# Patient Record
Sex: FEMALE | Race: WHITE | NOT HISPANIC OR LATINO | Employment: FULL TIME | URBAN - METROPOLITAN AREA
[De-identification: names, ages, dates, MRNs, and addresses within clinical notes are randomized per-mention and may not be internally consistent; named-entity substitution may affect disease eponyms.]

---

## 2019-07-15 ENCOUNTER — LAB (OUTPATIENT)
Dept: LAB | Facility: MEDICAL CENTER | Age: 29
End: 2019-07-15
Attending: INTERNAL MEDICINE
Payer: COMMERCIAL

## 2019-07-15 ENCOUNTER — OFFICE VISIT (OUTPATIENT)
Dept: GASTROENTEROLOGY | Facility: MEDICAL CENTER | Age: 29
End: 2019-07-15
Payer: COMMERCIAL

## 2019-07-15 VITALS
TEMPERATURE: 98.5 F | DIASTOLIC BLOOD PRESSURE: 72 MMHG | BODY MASS INDEX: 21.78 KG/M2 | WEIGHT: 127.6 LBS | HEART RATE: 51 BPM | SYSTOLIC BLOOD PRESSURE: 108 MMHG | HEIGHT: 64 IN

## 2019-07-15 DIAGNOSIS — K92.1 MELENA: ICD-10-CM

## 2019-07-15 DIAGNOSIS — R10.13 EPIGASTRIC PAIN: ICD-10-CM

## 2019-07-15 DIAGNOSIS — K90.0 CELIAC DISEASE: ICD-10-CM

## 2019-07-15 DIAGNOSIS — K90.0 CELIAC DISEASE: Primary | ICD-10-CM

## 2019-07-15 LAB
ALBUMIN SERPL BCP-MCNC: 3.7 G/DL (ref 3.5–5)
ALP SERPL-CCNC: 48 U/L (ref 46–116)
ALT SERPL W P-5'-P-CCNC: 31 U/L (ref 12–78)
AST SERPL W P-5'-P-CCNC: 15 U/L (ref 5–45)
BILIRUB DIRECT SERPL-MCNC: 0.1 MG/DL (ref 0–0.2)
BILIRUB SERPL-MCNC: 0.29 MG/DL (ref 0.2–1)
ERYTHROCYTE [DISTWIDTH] IN BLOOD BY AUTOMATED COUNT: 11.7 % (ref 11.6–15.1)
FERRITIN SERPL-MCNC: 17 NG/ML (ref 8–388)
HCT VFR BLD AUTO: 39.5 % (ref 34.8–46.1)
HGB BLD-MCNC: 12.9 G/DL (ref 11.5–15.4)
IRON SATN MFR SERPL: 34 %
IRON SERPL-MCNC: 125 UG/DL (ref 50–170)
LIPASE SERPL-CCNC: 147 U/L (ref 73–393)
MCH RBC QN AUTO: 32.2 PG (ref 26.8–34.3)
MCHC RBC AUTO-ENTMCNC: 32.7 G/DL (ref 31.4–37.4)
MCV RBC AUTO: 99 FL (ref 82–98)
PLATELET # BLD AUTO: 211 THOUSANDS/UL (ref 149–390)
PMV BLD AUTO: 10.6 FL (ref 8.9–12.7)
PROT SERPL-MCNC: 7.1 G/DL (ref 6.4–8.2)
RBC # BLD AUTO: 4.01 MILLION/UL (ref 3.81–5.12)
TIBC SERPL-MCNC: 370 UG/DL (ref 250–450)
WBC # BLD AUTO: 5.01 THOUSAND/UL (ref 4.31–10.16)

## 2019-07-15 PROCEDURE — 80076 HEPATIC FUNCTION PANEL: CPT

## 2019-07-15 PROCEDURE — 82784 ASSAY IGA/IGD/IGG/IGM EACH: CPT

## 2019-07-15 PROCEDURE — 83550 IRON BINDING TEST: CPT

## 2019-07-15 PROCEDURE — 85027 COMPLETE CBC AUTOMATED: CPT

## 2019-07-15 PROCEDURE — 36415 COLL VENOUS BLD VENIPUNCTURE: CPT

## 2019-07-15 PROCEDURE — 83540 ASSAY OF IRON: CPT

## 2019-07-15 PROCEDURE — 99204 OFFICE O/P NEW MOD 45 MIN: CPT | Performed by: INTERNAL MEDICINE

## 2019-07-15 PROCEDURE — 83516 IMMUNOASSAY NONANTIBODY: CPT

## 2019-07-15 PROCEDURE — 86255 FLUORESCENT ANTIBODY SCREEN: CPT

## 2019-07-15 PROCEDURE — 83690 ASSAY OF LIPASE: CPT

## 2019-07-15 PROCEDURE — 82728 ASSAY OF FERRITIN: CPT

## 2019-07-15 RX ORDER — DOXYCYCLINE 40 MG/1
CAPSULE ORAL
COMMUNITY
Start: 2019-07-15 | End: 2022-04-22 | Stop reason: HOSPADM

## 2019-07-15 RX ORDER — DICYCLOMINE HYDROCHLORIDE 10 MG/1
CAPSULE ORAL
COMMUNITY
Start: 2019-07-13 | End: 2020-12-24

## 2019-07-15 RX ORDER — NORGESTIMATE AND ETHINYL ESTRADIOL 7DAYSX3 28
1 KIT ORAL DAILY
Refills: 3 | COMMUNITY
Start: 2019-05-27 | End: 2022-04-22 | Stop reason: HOSPADM

## 2019-07-15 RX ORDER — OMEPRAZOLE 20 MG/1
20 CAPSULE, DELAYED RELEASE ORAL DAILY
Qty: 30 CAPSULE | Refills: 5 | Status: SHIPPED | OUTPATIENT
Start: 2019-07-15 | End: 2020-12-24

## 2019-07-15 NOTE — PROGRESS NOTES
[facility] - Outpatient Consultation  Palak Quach 33 y o  female MRN: [id number]  Encounter: [id number]          ASSESSMENT AND PLAN:      1  Celiac disease  2  Epigastric pain  3  Melena  She has a history of celiac disease and now presents with acute onset of epigastric pain and possible melena  I will check her hemoglobin, iron studies, celiac profile, liver enzymes, and lipase to evaluate the source of her symptoms  I will also schedule her for an upper endoscopy to re-stage her celiac disease and to rule out peptic ulcer disease or gastritis which could be contributing to her possible melena and her epigastric pain  I will give her an empiric trial of omeprazole daily and have her follow up after the endoscopy  - CBC; Future  - Iron Panel; Future  - Hepatic function panel; Future  - Lipase; Future  - Celiac Disease Antibody Profile; Future  - EGD; Future  - omeprazole (PriLOSEC) 20 mg delayed release capsule; Take 1 capsule (20 mg total) by mouth daily  Dispense: 30 capsule; Refill: 5    ______________________________________________________________________    HPI:  She presents for evaluation because of approximately one week of epigastric pain and dark colored stools  She denies any nausea, vomiting, reflux, dysphagia, change in bowel habits, hematochezia, or weight loss  She feels this pain can be severe at times  She denies any lightheadedness, fatigue, or weakness  She has a history of anemia and a history of celiac sprue which was diagnosed approximately 13 years ago  She has been on a gluten free diet since that time  She denies any prior history of a colonoscopy and any family history of colon polyps or colon cancer  REVIEW OF SYSTEMS:    CONSTITUTIONAL: Denies any fever, chills, rigors, and weight loss  HEENT: No earache or tinnitus  Denies hearing loss or visual disturbances  CARDIOVASCULAR: No chest pain or palpitations     RESPIRATORY: Denies any cough, hemoptysis, shortness of breath or dyspnea on exertion  GASTROINTESTINAL: As noted in the History of Present Illness  GENITOURINARY: No problems with urination  Denies any hematuria or dysuria  NEUROLOGIC: No dizziness or vertigo, denies headaches  MUSCULOSKELETAL: Denies any muscle or joint pain  SKIN: Denies skin rashes or itching  ENDOCRINE: Denies excessive thirst  Denies intolerance to heat or cold  PSYCHOSOCIAL: Denies depression or anxiety  Denies any recent memory loss  Historical Information   Past Medical History:   Diagnosis Date    Celiac disease      Past Surgical History:   Procedure Laterality Date    EGD      KNEE SURGERY       Social History   Social History     Substance and Sexual Activity   Alcohol Use Not Currently     Social History     Substance and Sexual Activity   Drug Use Never     Social History     Tobacco Use   Smoking Status Never Smoker   Smokeless Tobacco Never Used     Family History   Problem Relation Age of Onset    Celiac disease Mother     Colon cancer Maternal Grandfather     Lung cancer Maternal Grandfather        Meds/Allergies       Current Outpatient Medications:     dicyclomine (BENTYL) 10 mg capsule    doxycycline (ORACEA) 40 MG capsule    TRI FEMYNOR 0 18/0 215/0 25 MG-35 MCG per tablet    omeprazole (PriLOSEC) 20 mg delayed release capsule    Allergies   Allergen Reactions    Penicillins Hives           Objective     Blood pressure 108/72, pulse (!) 51, temperature 98 5 °F (36 9 °C), height 5' 4" (1 626 m), weight 57 9 kg (127 lb 9 6 oz)  Body mass index is 21 9 kg/m²  PHYSICAL EXAM:      General Appearance:   Alert, cooperative, no distress   HEENT:   Normocephalic, atraumatic, anicteric      Neck:  Supple, symmetrical, trachea midline   Lungs:   Clear to auscultation bilaterally; no rales, rhonchi or wheezing; respirations unlabored    Heart[de-identified]   Regular rate and rhythm; no murmur, rub, or gallop     Abdomen:   Soft, epigastric tenderness, non-distended; normal bowel sounds; no masses, no organomegaly    Genitalia:   Deferred    Rectal:   Deferred    Extremities:  No cyanosis, clubbing or edema    Pulses:  2+ and symmetric    Skin:  No jaundice, rashes, or lesions    Lymph nodes:  No palpable cervical lymphadenopathy        Lab Results:   No results found for any previous visit  Radiology Results:   No results found

## 2019-07-15 NOTE — H&P (VIEW-ONLY)
[facility] - Outpatient Consultation  Krystal Valentino 33 y o  female MRN: [id number]  Encounter: [id number]          ASSESSMENT AND PLAN:      1  Celiac disease  2  Epigastric pain  3  Melena  She has a history of celiac disease and now presents with acute onset of epigastric pain and possible melena  I will check her hemoglobin, iron studies, celiac profile, liver enzymes, and lipase to evaluate the source of her symptoms  I will also schedule her for an upper endoscopy to re-stage her celiac disease and to rule out peptic ulcer disease or gastritis which could be contributing to her possible melena and her epigastric pain  I will give her an empiric trial of omeprazole daily and have her follow up after the endoscopy  - CBC; Future  - Iron Panel; Future  - Hepatic function panel; Future  - Lipase; Future  - Celiac Disease Antibody Profile; Future  - EGD; Future  - omeprazole (PriLOSEC) 20 mg delayed release capsule; Take 1 capsule (20 mg total) by mouth daily  Dispense: 30 capsule; Refill: 5    ______________________________________________________________________    HPI:  She presents for evaluation because of approximately one week of epigastric pain and dark colored stools  She denies any nausea, vomiting, reflux, dysphagia, change in bowel habits, hematochezia, or weight loss  She feels this pain can be severe at times  She denies any lightheadedness, fatigue, or weakness  She has a history of anemia and a history of celiac sprue which was diagnosed approximately 13 years ago  She has been on a gluten free diet since that time  She denies any prior history of a colonoscopy and any family history of colon polyps or colon cancer  REVIEW OF SYSTEMS:    CONSTITUTIONAL: Denies any fever, chills, rigors, and weight loss  HEENT: No earache or tinnitus  Denies hearing loss or visual disturbances  CARDIOVASCULAR: No chest pain or palpitations     RESPIRATORY: Denies any cough, hemoptysis, shortness of breath or dyspnea on exertion  GASTROINTESTINAL: As noted in the History of Present Illness  GENITOURINARY: No problems with urination  Denies any hematuria or dysuria  NEUROLOGIC: No dizziness or vertigo, denies headaches  MUSCULOSKELETAL: Denies any muscle or joint pain  SKIN: Denies skin rashes or itching  ENDOCRINE: Denies excessive thirst  Denies intolerance to heat or cold  PSYCHOSOCIAL: Denies depression or anxiety  Denies any recent memory loss  Historical Information   Past Medical History:   Diagnosis Date    Celiac disease      Past Surgical History:   Procedure Laterality Date    EGD      KNEE SURGERY       Social History   Social History     Substance and Sexual Activity   Alcohol Use Not Currently     Social History     Substance and Sexual Activity   Drug Use Never     Social History     Tobacco Use   Smoking Status Never Smoker   Smokeless Tobacco Never Used     Family History   Problem Relation Age of Onset    Celiac disease Mother     Colon cancer Maternal Grandfather     Lung cancer Maternal Grandfather        Meds/Allergies       Current Outpatient Medications:     dicyclomine (BENTYL) 10 mg capsule    doxycycline (ORACEA) 40 MG capsule    TRI FEMYNOR 0 18/0 215/0 25 MG-35 MCG per tablet    omeprazole (PriLOSEC) 20 mg delayed release capsule    Allergies   Allergen Reactions    Penicillins Hives           Objective     Blood pressure 108/72, pulse (!) 51, temperature 98 5 °F (36 9 °C), height 5' 4" (1 626 m), weight 57 9 kg (127 lb 9 6 oz)  Body mass index is 21 9 kg/m²  PHYSICAL EXAM:      General Appearance:   Alert, cooperative, no distress   HEENT:   Normocephalic, atraumatic, anicteric      Neck:  Supple, symmetrical, trachea midline   Lungs:   Clear to auscultation bilaterally; no rales, rhonchi or wheezing; respirations unlabored    Heart[de-identified]   Regular rate and rhythm; no murmur, rub, or gallop     Abdomen:   Soft, epigastric tenderness, non-distended; normal bowel sounds; no masses, no organomegaly    Genitalia:   Deferred    Rectal:   Deferred    Extremities:  No cyanosis, clubbing or edema    Pulses:  2+ and symmetric    Skin:  No jaundice, rashes, or lesions    Lymph nodes:  No palpable cervical lymphadenopathy        Lab Results:   No results found for any previous visit  Radiology Results:   No results found

## 2019-07-16 ENCOUNTER — ANESTHESIA EVENT (OUTPATIENT)
Dept: GASTROENTEROLOGY | Facility: MEDICAL CENTER | Age: 29
End: 2019-07-16

## 2019-07-16 LAB
ENDOMYSIUM IGA SER QL: NEGATIVE
GLIADIN PEPTIDE IGA SER-ACNC: 13 UNITS (ref 0–19)
GLIADIN PEPTIDE IGG SER-ACNC: 4 UNITS (ref 0–19)
IGA SERPL-MCNC: 108 MG/DL (ref 87–352)
TTG IGA SER-ACNC: 2 U/ML (ref 0–3)
TTG IGG SER-ACNC: <2 U/ML (ref 0–5)

## 2019-07-25 NOTE — RESULT ENCOUNTER NOTE
I called her with her results  Negative  She feels better on omeprazole  I'll biopsy for celiac during upcoming EGD

## 2019-08-09 ENCOUNTER — HOSPITAL ENCOUNTER (OUTPATIENT)
Dept: GASTROENTEROLOGY | Facility: MEDICAL CENTER | Age: 29
Setting detail: OUTPATIENT SURGERY
Discharge: HOME | End: 2019-08-09
Attending: INTERNAL MEDICINE | Admitting: INTERNAL MEDICINE
Payer: COMMERCIAL

## 2019-08-09 ENCOUNTER — ANESTHESIA (OUTPATIENT)
Dept: GASTROENTEROLOGY | Facility: MEDICAL CENTER | Age: 29
End: 2019-08-09

## 2019-08-09 VITALS
DIASTOLIC BLOOD PRESSURE: 73 MMHG | HEIGHT: 64 IN | TEMPERATURE: 98.2 F | SYSTOLIC BLOOD PRESSURE: 122 MMHG | OXYGEN SATURATION: 100 % | WEIGHT: 127 LBS | HEART RATE: 50 BPM | RESPIRATION RATE: 16 BRPM | BODY MASS INDEX: 21.68 KG/M2

## 2019-08-09 DIAGNOSIS — K90.0 CELIAC DISEASE: ICD-10-CM

## 2019-08-09 DIAGNOSIS — K92.1 MELENA: ICD-10-CM

## 2019-08-09 DIAGNOSIS — R10.13 EPIGASTRIC PAIN: ICD-10-CM

## 2019-08-09 LAB
EXT PREGNANCY TEST URINE: NEGATIVE
EXT. CONTROL: NORMAL

## 2019-08-09 PROCEDURE — 81025 URINE PREGNANCY TEST: CPT | Performed by: ANESTHESIOLOGY

## 2019-08-09 PROCEDURE — 88313 SPECIAL STAINS GROUP 2: CPT | Performed by: PATHOLOGY

## 2019-08-09 PROCEDURE — 43239 EGD BIOPSY SINGLE/MULTIPLE: CPT | Performed by: INTERNAL MEDICINE

## 2019-08-09 PROCEDURE — 88305 TISSUE EXAM BY PATHOLOGIST: CPT | Performed by: PATHOLOGY

## 2019-08-09 RX ORDER — PROPOFOL 10 MG/ML
INJECTION, EMULSION INTRAVENOUS AS NEEDED
Status: DISCONTINUED | OUTPATIENT
Start: 2019-08-09 | End: 2019-08-09 | Stop reason: SURG

## 2019-08-09 RX ORDER — SODIUM CHLORIDE 9 MG/ML
125 INJECTION, SOLUTION INTRAVENOUS CONTINUOUS
Status: DISCONTINUED | OUTPATIENT
Start: 2019-08-09 | End: 2019-08-13 | Stop reason: HOSPADM

## 2019-08-09 RX ADMIN — PROPOFOL 30 MG: 10 INJECTION, EMULSION INTRAVENOUS at 15:34

## 2019-08-09 RX ADMIN — PROPOFOL 150 MG: 10 INJECTION, EMULSION INTRAVENOUS at 15:31

## 2019-08-09 RX ADMIN — SODIUM CHLORIDE 125 ML/HR: 0.9 INJECTION, SOLUTION INTRAVENOUS at 14:58

## 2019-08-09 RX ADMIN — PROPOFOL 50 MG: 10 INJECTION, EMULSION INTRAVENOUS at 15:32

## 2019-08-09 NOTE — DISCHARGE INSTRUCTIONS
Upper Endoscopy   WHAT YOU NEED TO KNOW:   An upper endoscopy is also called an upper gastrointestinal (GI) endoscopy, or an esophagogastroduodenoscopy (EGD)  You may feel bloated, gassy, or have some abdominal discomfort after your procedure  Your throat may be sore for 24 to 36 hours  You may burp or pass gas from air that is still inside your body  DISCHARGE INSTRUCTIONS:   Call 911 for any of the following:   · You have sudden chest pain or trouble breathing  Seek care immediately if:   · You feel dizzy or faint  · You have trouble swallowing  · Your bowel movements are very dark or black  · Your abdomen is hard and firm and you have severe pain  · You vomit blood  Contact your healthcare provider if:   · You feel full or bloated and cannot burp or pass gas  · You have not had a bowel movement for 3 days after your procedure  · You have neck pain  · You have a fever or chills  · You have nausea or are vomiting  · You have a rash or hives  · You have questions or concerns about your endoscopy  Relieve a sore throat:  Suck on throat lozenges or crushed ice  Gargle with a small amount of warm salt water  Mix 1 teaspoon of salt and 1 cup of warm water to make salt water  Relieve gas and discomfort from bloating:  Lie on your right side with a heating pad on your abdomen  Take short walks to help pass gas  Eat small meals until bloating is relieved  Rest after your procedure: You have been given medicine to relax you  Do not  drive or make important decisions until the day after your procedure  Return to your normal activity as directed  You can usually return to work the day after your procedure  Follow up with your healthcare provider as directed:  Write down your questions so you remember to ask them during your visits     © 2017 [address] is for End User's use only and may not be sold, redistributed or otherwise used for commercial purposes  All illustrations and images included in CareNotes® are the copyrighted property of [employer]  or [employer]  The above information is an  only  It is not intended as medical advice for individual conditions or treatments  Talk to your doctor, nurse or pharmacist before following any medical regimen to see if it is safe and effective for you  Gastritis   WHAT YOU NEED TO KNOW:   What is gastritis? Gastritis is inflammation or irritation of the lining of your stomach  What increases my risk for gastritis? · Infection with bacteria, a virus, or a parasite    · NSAIDs, aspirin, or steroid medicine    · Use of tobacco products or alcohol    · Trauma such as an injury to your stomach or intestine    · Autoimmune disorders such as diabetes, thyroid disease, or Crohn disease    · Stress    · Age older than 60 years    · Illegal drugs, such as cocaine  What are the signs and symptoms of gastritis? · Stomach pain, burning, or tenderness when you press on it    · Stomach fullness or tightness    · Nausea or vomiting    · Loss of appetite, or feeling full quickly when you eat    · Bad breath    · Fatigue or feeling more tired than usual    · Heartburn  How is gastritis diagnosed? Your healthcare provider will ask about your signs and symptoms and examine you  You may need any of the following:  · Blood tests  may be used to show an infection, dehydration, or anemia (low red blood cell levels)  · A bowel movement sample  may be tested for blood or the germ that may be causing your gastritis  · A breath test  may show if H pylori is causing your gastritis  You will be given a liquid to drink  Then you will breathe into a bag  Your healthcare provider will measure the amount of carbon dioxide in your breath  Extra amounts of carbon dioxide may mean you have an H pylori infection  · An endoscopy  may be used to look for irritation or bleeding in your stomach  Your healthcare provider will use an endoscope (tube with a light and camera on the end) during the procedure  He or she may take a sample from your stomach to be tested  How is gastritis treated? Your symptoms may go away without treatment  Treatment will depend on what is causing your gastritis  Your healthcare provider may recommend changes to the medicines you take  Medicines may be given to help treat a bacterial infection or decrease stomach acid  How can I manage or prevent gastritis? · Do not smoke  Nicotine and other chemicals in cigarettes and cigars can make your symptoms worse and cause lung damage  Ask your healthcare provider for information if you currently smoke and need help to quit  E-cigarettes or smokeless tobacco still contain nicotine  Talk to your healthcare provider before you use these products  · Do not drink alcohol  Alcohol can prevent healing and make your gastritis worse  Talk to your healthcare provider if you need help to stop drinking  · Do not take NSAIDs or aspirin unless directed  These and similar medicines can cause irritation of your stomach lining  If your healthcare provider says it is okay to take NSAIDs, take them with food  · Do not eat foods that cause irritation  Foods such as oranges and salsa can cause burning or pain  Eat a variety of healthy foods  Examples include fruits (not citrus), vegetables, low-fat dairy products, beans, whole-grain breads, and lean meats and fish  Try to eat small meals, and drink water with your meals  Do not eat for at least 3 hours before you go to bed  · Find ways to relax and decrease stress  Stress can increase stomach acid and make gastritis worse  Activities such as yoga, meditation, or listening to music can help you relax  Spend time with friends, or do things you enjoy  Call 911 for any of the following:   · You develop chest pain or shortness of breath  When should I seek immediate care?    · You vomit blood     · You have black or bloody bowel movements  · You have severe stomach or back pain  When should I contact my healthcare provider? · You have a fever  · You have new or worsening symptoms, even after treatment  · You have questions or concerns about your condition or care  CARE AGREEMENT:   You have the right to help plan your care  Learn about your health condition and how it may be treated  Discuss treatment options with your caregivers to decide what care you want to receive  You always have the right to refuse treatment  The above information is an  only  It is not intended as medical advice for individual conditions or treatments  Talk to your doctor, nurse or pharmacist before following any medical regimen to see if it is safe and effective for you  © 2017 [address]  Information is for End User's use only and may not be sold, redistributed or otherwise used for commercial purposes  All illustrations and images included in CareNotes® are the copyrighted property of [employer]  or [employer]

## 2019-08-09 NOTE — ANESTHESIA PREPROCEDURE EVALUATION
Review of Systems/Medical History  Patient summary reviewed  Chart reviewed      Cardiovascular  Negative cardio ROS    Pulmonary  Negative pulmonary ROS        GI/Hepatic      Comment: Celiac disease  Takes Prilosec for abdominal pain  Denies reflux symptoms     Negative  ROS        Endo/Other  Negative endo/other ROS      GYN  Negative gynecology ROS          Hematology  Negative hematology ROS      Musculoskeletal  Negative musculoskeletal ROS        Neurology  Negative neurology ROS      Psychology   Negative psychology ROS              Physical Exam    Airway    Mallampati score: II  TM Distance: >3 FB  Neck ROM: full     Dental   No notable dental hx     Cardiovascular  Comment: Negative ROS, Rhythm: regular, Rate: normal, Cardiovascular exam normal    Pulmonary  Pulmonary exam normal Breath sounds clear to auscultation,     Other Findings        Anesthesia Plan  ASA Score- 2     Anesthesia Type- IV sedation with anesthesia with ASA Monitors  Additional Monitors:   Airway Plan:         Plan Factors-    Induction-     Postoperative Plan-     Informed Consent- Anesthetic plan and risks discussed with patient

## 2019-08-14 NOTE — RESULT ENCOUNTER NOTE
My medical assistant will call her with her results  Biopsies were negative for celiac and H pylori

## 2020-01-27 ENCOUNTER — TELEPHONE (OUTPATIENT)
Dept: GASTROENTEROLOGY | Facility: CLINIC | Age: 30
End: 2020-01-27

## 2020-01-27 NOTE — TELEPHONE ENCOUNTER
If it isn't helping with her symptoms she could discontinue it  Her EGD was normal with normal biopsies  She can also schedule a follow up to discuss in detail

## 2020-01-27 NOTE — TELEPHONE ENCOUNTER
GI Physician: Dr Faye Garibay    Refill Request for Medication: Omeprazole    Dose: 40 mg    Quantity: 30    Pharmacy: [facility]    Pt wanted to know if she should continue to take medication?  If so, pt will need a refill and updates

## 2020-01-27 NOTE — TELEPHONE ENCOUNTER
Called Angy Mcfadden to let her know that she can stop the omeprazole since it is not helping her and she can make an appointment to discuss any symptoms she is having  She was disappointed with PA's response  She feels "like she is not being taken care of now or when she was in for her EGD " She states she was never called after her EGD and was not told how  long to take the omeprazole  (I found it in her OV from 7/15/2019)  She stated she was going to another doctor

## 2020-01-27 NOTE — TELEPHONE ENCOUNTER
I called her; she said it was not my recommendation that upset her but the lack of follow up  We discussed the results of her EGD in detail (clarified that she did have erosive gastritis but no ulcers) and that typically a 2 month course of PPIs would heal this; she is on a low dose of omeprazole so I suggested that she could try discontinuing it and if her symptoms return, she could resume it  I offered to see her in the office for follow-up and she said she would consider it but she feels that the physician did not care because he did not follow up after the procedure

## 2020-01-27 NOTE — TELEPHONE ENCOUNTER
History of celiac disease, epigastric pain, melena  She is calling today to find out how long she should be on the omeprazole  She started taking it in July and the last OV states she should take it for 6 months  So it is 6 months and she wants to know what she should do now  She still is nauseous when her belly is empty  Is it safe for her to continue it, should she stop, or take as needed? Please advise

## 2020-01-27 NOTE — TELEPHONE ENCOUNTER
Jaleel Sandoval, my clinical coordinator asked me to let you know about this patient and her dissatisfaction with us  She was not loud and angry  She did thank me for calling her back  She was dissatisfied with her experience here and is going to see another doctor

## 2020-01-28 NOTE — TELEPHONE ENCOUNTER
Thank you Gemini Cowan and Jessee  I spoke with her  She is mainly upset she was not scheduled for a follow-up appointment  I apologized and explained she was supposed to have a follow-up but I'm not sure why it was not scheduled  She is going to change to a different practice

## 2020-12-24 ENCOUNTER — OFFICE VISIT (OUTPATIENT)
Dept: FAMILY MEDICINE CLINIC | Facility: CLINIC | Age: 30
End: 2020-12-24
Payer: COMMERCIAL

## 2020-12-24 VITALS
BODY MASS INDEX: 22.88 KG/M2 | HEIGHT: 64 IN | TEMPERATURE: 97.2 F | WEIGHT: 134 LBS | DIASTOLIC BLOOD PRESSURE: 68 MMHG | SYSTOLIC BLOOD PRESSURE: 110 MMHG

## 2020-12-24 DIAGNOSIS — F41.9 ANXIETY: ICD-10-CM

## 2020-12-24 DIAGNOSIS — Z00.00 ANNUAL PHYSICAL EXAM: Primary | ICD-10-CM

## 2020-12-24 PROBLEM — K90.0 CELIAC DISEASE: Status: ACTIVE | Noted: 2019-09-13

## 2020-12-24 PROBLEM — L70.9 ACNE: Status: ACTIVE | Noted: 2019-09-13

## 2020-12-24 PROCEDURE — 3725F SCREEN DEPRESSION PERFORMED: CPT | Performed by: FAMILY MEDICINE

## 2020-12-24 PROCEDURE — 99385 PREV VISIT NEW AGE 18-39: CPT | Performed by: FAMILY MEDICINE

## 2020-12-24 PROCEDURE — 3008F BODY MASS INDEX DOCD: CPT | Performed by: FAMILY MEDICINE

## 2020-12-24 PROCEDURE — 1036F TOBACCO NON-USER: CPT | Performed by: FAMILY MEDICINE

## 2020-12-24 RX ORDER — ESCITALOPRAM OXALATE 5 MG/1
5 TABLET ORAL DAILY
Qty: 90 TABLET | Refills: 3 | Status: SHIPPED | OUTPATIENT
Start: 2020-12-24 | End: 2021-01-21

## 2020-12-24 RX ORDER — SPIRONOLACTONE 100 MG/1
100 TABLET, FILM COATED ORAL DAILY
COMMUNITY
Start: 2020-11-20 | End: 2022-04-22 | Stop reason: HOSPADM

## 2021-01-21 ENCOUNTER — OFFICE VISIT (OUTPATIENT)
Dept: FAMILY MEDICINE CLINIC | Facility: CLINIC | Age: 31
End: 2021-01-21
Payer: COMMERCIAL

## 2021-01-21 VITALS
SYSTOLIC BLOOD PRESSURE: 104 MMHG | DIASTOLIC BLOOD PRESSURE: 68 MMHG | TEMPERATURE: 96.8 F | HEIGHT: 64 IN | BODY MASS INDEX: 22.71 KG/M2 | WEIGHT: 133 LBS

## 2021-01-21 DIAGNOSIS — F41.9 ANXIETY: Primary | ICD-10-CM

## 2021-01-21 PROCEDURE — 99213 OFFICE O/P EST LOW 20 MIN: CPT | Performed by: FAMILY MEDICINE

## 2021-01-21 PROCEDURE — 1036F TOBACCO NON-USER: CPT | Performed by: FAMILY MEDICINE

## 2021-01-21 PROCEDURE — 3008F BODY MASS INDEX DOCD: CPT | Performed by: FAMILY MEDICINE

## 2021-01-21 RX ORDER — BUPROPION HYDROCHLORIDE 150 MG/1
150 TABLET ORAL DAILY
Qty: 90 TABLET | Refills: 1 | Status: SHIPPED | OUTPATIENT
Start: 2021-01-21 | End: 2021-04-19 | Stop reason: SDUPTHER

## 2021-01-21 NOTE — PROGRESS NOTES
Assessment/Plan: We had a discussion about the options  We decided to stop the escitalopram for now and start bupropion  Will follow-up with phone call about 3 weeks to see how she is doing sooner if needed  She has not had lab work done yet I will call with results  Diagnoses and all orders for this visit:    Anxiety  -     buPROPion (WELLBUTRIN XL) 150 mg 24 hr tablet; Take 1 tablet (150 mg total) by mouth daily          Subjective:     Patient ID: Olegario Streeter is a 31 y o  female  She does feel the escitalopram is helping with her anxiety symptoms however is causing other side effects specifically sexual dysfunction  She has been on medication for 3 weeks  She knows the affects when for starting the medicine  Review of Systems   Constitutional: Negative  HENT: Negative  Eyes: Negative  Respiratory: Negative  Cardiovascular: Negative  Gastrointestinal: Negative  Endocrine: Negative  Genitourinary: Negative  Musculoskeletal: Negative  Skin: Negative  Allergic/Immunologic: Negative  Neurological: Negative  Hematological: Negative  Psychiatric/Behavioral: Negative  All other systems reviewed and are negative  Objective:     Physical Exam  Vitals signs and nursing note reviewed  Constitutional:       Appearance: She is well-developed  HENT:      Head: Normocephalic and atraumatic  Right Ear: External ear normal       Left Ear: External ear normal       Nose: Nose normal    Eyes:      Conjunctiva/sclera: Conjunctivae normal       Pupils: Pupils are equal, round, and reactive to light  Neck:      Musculoskeletal: Normal range of motion and neck supple  Cardiovascular:      Rate and Rhythm: Normal rate and regular rhythm  Heart sounds: Normal heart sounds  Pulmonary:      Effort: Pulmonary effort is normal       Breath sounds: Normal breath sounds     Abdominal:      General: Bowel sounds are normal       Palpations: Abdomen is soft    Musculoskeletal: Normal range of motion  Skin:     General: Skin is warm and dry  Neurological:      Mental Status: She is alert and oriented to person, place, and time  Deep Tendon Reflexes: Reflexes are normal and symmetric     Psychiatric:         Behavior: Behavior normal

## 2021-01-30 LAB
25(OH)D3 SERPL-MCNC: 26 NG/ML (ref 30–100)
ALBUMIN SERPL-MCNC: 4.2 G/DL (ref 3.6–5.1)
ALBUMIN/GLOB SERPL: 1.8 (CALC) (ref 1–2.5)
ALP SERPL-CCNC: 35 U/L (ref 31–125)
ALT SERPL-CCNC: 17 U/L (ref 6–29)
AST SERPL-CCNC: 12 U/L (ref 10–30)
BASOPHILS # BLD AUTO: 39 CELLS/UL (ref 0–200)
BASOPHILS NFR BLD AUTO: 1 %
BILIRUB SERPL-MCNC: 0.3 MG/DL (ref 0.2–1.2)
BUN SERPL-MCNC: 16 MG/DL (ref 7–25)
BUN/CREAT SERPL: NORMAL (CALC) (ref 6–22)
CALCIUM SERPL-MCNC: 9.4 MG/DL (ref 8.6–10.2)
CHLORIDE SERPL-SCNC: 105 MMOL/L (ref 98–110)
CHOLEST SERPL-MCNC: 223 MG/DL
CHOLEST/HDLC SERPL: 2.3 (CALC)
CO2 SERPL-SCNC: 27 MMOL/L (ref 20–32)
CREAT SERPL-MCNC: 0.88 MG/DL (ref 0.5–1.1)
EOSINOPHIL # BLD AUTO: 133 CELLS/UL (ref 15–500)
EOSINOPHIL NFR BLD AUTO: 3.4 %
ERYTHROCYTE [DISTWIDTH] IN BLOOD BY AUTOMATED COUNT: 12.1 % (ref 11–15)
GLOBULIN SER CALC-MCNC: 2.4 G/DL (CALC) (ref 1.9–3.7)
GLUCOSE SERPL-MCNC: 89 MG/DL (ref 65–99)
HCT VFR BLD AUTO: 40.7 % (ref 35–45)
HDLC SERPL-MCNC: 97 MG/DL
HGB BLD-MCNC: 14 G/DL (ref 11.7–15.5)
LDLC SERPL CALC-MCNC: 111 MG/DL (CALC)
LYMPHOCYTES # BLD AUTO: 1537 CELLS/UL (ref 850–3900)
LYMPHOCYTES NFR BLD AUTO: 39.4 %
MCH RBC QN AUTO: 32.7 PG (ref 27–33)
MCHC RBC AUTO-ENTMCNC: 34.4 G/DL (ref 32–36)
MCV RBC AUTO: 95.1 FL (ref 80–100)
MONOCYTES # BLD AUTO: 382 CELLS/UL (ref 200–950)
MONOCYTES NFR BLD AUTO: 9.8 %
NEUTROPHILS # BLD AUTO: 1810 CELLS/UL (ref 1500–7800)
NEUTROPHILS NFR BLD AUTO: 46.4 %
NONHDLC SERPL-MCNC: 126 MG/DL (CALC)
PLATELET # BLD AUTO: 257 THOUSAND/UL (ref 140–400)
PMV BLD REES-ECKER: 10.5 FL (ref 7.5–12.5)
POTASSIUM SERPL-SCNC: 4.7 MMOL/L (ref 3.5–5.3)
PROT SERPL-MCNC: 6.6 G/DL (ref 6.1–8.1)
RBC # BLD AUTO: 4.28 MILLION/UL (ref 3.8–5.1)
SL AMB EGFR AFRICAN AMERICAN: 101 ML/MIN/1.73M2
SL AMB EGFR NON AFRICAN AMERICAN: 88 ML/MIN/1.73M2
SODIUM SERPL-SCNC: 138 MMOL/L (ref 135–146)
TRIGL SERPL-MCNC: 67 MG/DL
TSH SERPL-ACNC: 2.67 MIU/L
WBC # BLD AUTO: 3.9 THOUSAND/UL (ref 3.8–10.8)

## 2021-04-15 ENCOUNTER — TELEPHONE (OUTPATIENT)
Dept: FAMILY MEDICINE CLINIC | Facility: CLINIC | Age: 31
End: 2021-04-15

## 2021-04-15 NOTE — TELEPHONE ENCOUNTER
Patient called with update on anxiety medication  She states she is feeling better, does not clench jaw anymore at all, but she still has neck tightness  She will be looking into going to chiropractor  She is not sure if she needs a higher dose  She also would like to get results from January labs (note said it would be discussed at next visit)  Please advise  Thank you

## 2021-04-15 NOTE — TELEPHONE ENCOUNTER
Labs were good  Vitamin-D was slightly low  She should supplement with vitamin D3 over-the-counter  She should follow up with me in the office at her convenience so we can discuss her medication  At that time we can also go over the labs and further recommendations  Thank you

## 2021-04-19 ENCOUNTER — OFFICE VISIT (OUTPATIENT)
Dept: FAMILY MEDICINE CLINIC | Facility: CLINIC | Age: 31
End: 2021-04-19
Payer: COMMERCIAL

## 2021-04-19 VITALS
WEIGHT: 131 LBS | BODY MASS INDEX: 22.36 KG/M2 | DIASTOLIC BLOOD PRESSURE: 60 MMHG | TEMPERATURE: 98 F | HEIGHT: 64 IN | SYSTOLIC BLOOD PRESSURE: 110 MMHG

## 2021-04-19 DIAGNOSIS — F41.9 ANXIETY: ICD-10-CM

## 2021-04-19 PROCEDURE — 1036F TOBACCO NON-USER: CPT | Performed by: FAMILY MEDICINE

## 2021-04-19 PROCEDURE — 3008F BODY MASS INDEX DOCD: CPT | Performed by: FAMILY MEDICINE

## 2021-04-19 PROCEDURE — 99214 OFFICE O/P EST MOD 30 MIN: CPT | Performed by: FAMILY MEDICINE

## 2021-04-19 RX ORDER — BUPROPION HYDROCHLORIDE 150 MG/1
150 TABLET ORAL DAILY
Qty: 90 TABLET | Refills: 1 | Status: SHIPPED | OUTPATIENT
Start: 2021-04-19 | End: 2021-10-12

## 2021-04-19 NOTE — PROGRESS NOTES
Assessment/Plan:     She is doing well  Continue current therapy  She will follow-up with chiropractic can do some physical therapy and stretching exercises for her neck  Also recommend relaxation techniques such as biofeedback  She is ready doing exercise  Follow-up in 6 months sooner if needed  Diagnoses and all orders for this visit:    Anxiety  -     buPROPion (WELLBUTRIN XL) 150 mg 24 hr tablet; Take 1 tablet (150 mg total) by mouth daily          Subjective:     Patient ID: Linden Trimble is a 31 y o  female  She is in today for follow-up  She is doing well with the BuSpar however still having some tightness in her neck  Last jaw clenching  Review of Systems   Constitutional: Negative  HENT: Negative  Eyes: Negative  Respiratory: Negative  Cardiovascular: Negative  Gastrointestinal: Negative  Endocrine: Negative  Genitourinary: Negative  Musculoskeletal: Negative  As noted in HPI  Skin: Negative  Allergic/Immunologic: Negative  Neurological: Negative  Hematological: Negative  Psychiatric/Behavioral: Negative  All other systems reviewed and are negative  Objective:     Physical Exam  Vitals signs and nursing note reviewed  Constitutional:       Appearance: She is well-developed  HENT:      Head: Normocephalic and atraumatic  Right Ear: External ear normal       Left Ear: External ear normal       Nose: Nose normal    Eyes:      Conjunctiva/sclera: Conjunctivae normal       Pupils: Pupils are equal, round, and reactive to light  Neck:      Musculoskeletal: Normal range of motion and neck supple  Cardiovascular:      Rate and Rhythm: Normal rate and regular rhythm  Heart sounds: Normal heart sounds  Pulmonary:      Effort: Pulmonary effort is normal       Breath sounds: Normal breath sounds  Abdominal:      General: Bowel sounds are normal       Palpations: Abdomen is soft  Musculoskeletal: Normal range of motion  Skin:     General: Skin is warm and dry  Neurological:      Mental Status: She is alert and oriented to person, place, and time  Deep Tendon Reflexes: Reflexes are normal and symmetric     Psychiatric:         Behavior: Behavior normal

## 2021-04-26 ENCOUNTER — APPOINTMENT (OUTPATIENT)
Dept: RADIOLOGY | Facility: MEDICAL CENTER | Age: 31
End: 2021-04-26
Payer: COMMERCIAL

## 2021-04-26 ENCOUNTER — TRANSCRIBE ORDERS (OUTPATIENT)
Dept: ADMINISTRATIVE | Facility: HOSPITAL | Age: 31
End: 2021-04-26

## 2021-04-26 DIAGNOSIS — G44.209 TENSION HEADACHE: ICD-10-CM

## 2021-04-26 DIAGNOSIS — M54.2 PAIN, NECK: ICD-10-CM

## 2021-04-26 DIAGNOSIS — M54.2 PAIN, NECK: Primary | ICD-10-CM

## 2021-04-26 PROCEDURE — 72040 X-RAY EXAM NECK SPINE 2-3 VW: CPT

## 2021-05-10 ENCOUNTER — APPOINTMENT (OUTPATIENT)
Dept: RADIOLOGY | Facility: MEDICAL CENTER | Age: 31
End: 2021-05-10
Payer: COMMERCIAL

## 2021-05-10 DIAGNOSIS — M54.2 PAIN, NECK: ICD-10-CM

## 2021-05-10 PROCEDURE — 72040 X-RAY EXAM NECK SPINE 2-3 VW: CPT

## 2021-09-20 LAB
EXTERNAL ABO GROUPING: NORMAL
EXTERNAL RH FACTOR: NEGATIVE

## 2021-10-29 LAB
EXTERNAL HEPATITIS B SURFACE ANTIGEN: NORMAL
EXTERNAL HIV-1/2 AB-AG: NORMAL
EXTERNAL RUBELLA IGG QUANTITATION: NORMAL

## 2021-11-18 LAB — EXTERNAL GONORRHEA SCREEN: NOT DETECTED

## 2022-03-01 LAB
EXTERNAL SYPHILIS RPR SCREEN: NORMAL
GLUCOSE 1H P 50 G GLC PO SERPL-MCNC: 91 MG/DL (ref 70–183)

## 2022-04-17 ENCOUNTER — ANESTHESIA EVENT (INPATIENT)
Dept: LABOR AND DELIVERY | Facility: HOSPITAL | Age: 32
End: 2022-04-17
Payer: COMMERCIAL

## 2022-04-17 ENCOUNTER — HOSPITAL ENCOUNTER (INPATIENT)
Facility: HOSPITAL | Age: 32
LOS: 5 days | Discharge: HOME | End: 2022-04-22
Attending: OBSTETRICS & GYNECOLOGY | Admitting: OBSTETRICS & GYNECOLOGY
Payer: COMMERCIAL

## 2022-04-17 DIAGNOSIS — O42.919 PRETERM PREMATURE RUPTURE OF MEMBRANES (PPROM) WITH UNKNOWN ONSET OF LABOR: Primary | ICD-10-CM

## 2022-04-17 DIAGNOSIS — Z98.891 S/P PRIMARY LOW TRANSVERSE C-SECTION: ICD-10-CM

## 2022-04-17 PROBLEM — Z67.91 RH NEGATIVE STATUS DURING PREGNANCY: Status: ACTIVE | Noted: 2022-04-17

## 2022-04-17 PROBLEM — R03.0 ELEVATED BP WITHOUT DIAGNOSIS OF HYPERTENSION: Status: ACTIVE | Noted: 2022-04-17

## 2022-04-17 PROBLEM — O26.899 RH NEGATIVE STATUS DURING PREGNANCY: Status: ACTIVE | Noted: 2022-04-17

## 2022-04-17 PROBLEM — Z3A.35 35 WEEKS GESTATION OF PREGNANCY: Status: ACTIVE | Noted: 2022-04-17

## 2022-04-17 LAB
ABO GROUP BLD: NORMAL
ABO GROUP BLD: NORMAL
ALBUMIN SERPL BCP-MCNC: 2.8 G/DL (ref 3.5–5)
ALP SERPL-CCNC: 164 U/L (ref 46–116)
ALT SERPL W P-5'-P-CCNC: 20 U/L (ref 12–78)
ANION GAP SERPL CALCULATED.3IONS-SCNC: 9 MMOL/L (ref 4–13)
AST SERPL W P-5'-P-CCNC: 17 U/L (ref 5–45)
BILIRUB SERPL-MCNC: 0.27 MG/DL (ref 0.2–1)
BLD GP AB SCN SERPL QL: POSITIVE
BLOOD GROUP ANTIBODIES SERPL: NORMAL
BUN SERPL-MCNC: 9 MG/DL (ref 5–25)
CALCIUM ALBUM COR SERPL-MCNC: 10.3 MG/DL (ref 8.3–10.1)
CALCIUM SERPL-MCNC: 9.3 MG/DL (ref 8.3–10.1)
CHLORIDE SERPL-SCNC: 102 MMOL/L (ref 100–108)
CO2 SERPL-SCNC: 24 MMOL/L (ref 21–32)
CREAT SERPL-MCNC: 0.67 MG/DL (ref 0.6–1.3)
ERYTHROCYTE [DISTWIDTH] IN BLOOD BY AUTOMATED COUNT: 11.9 % (ref 11.6–15.1)
GFR SERPL CREATININE-BSD FRML MDRD: 116 ML/MIN/1.73SQ M
GLUCOSE SERPL-MCNC: 76 MG/DL (ref 65–140)
HCT VFR BLD AUTO: 38.5 % (ref 34.8–46.1)
HGB BLD-MCNC: 13.4 G/DL (ref 11.5–15.4)
MCH RBC QN AUTO: 33.9 PG (ref 26.8–34.3)
MCHC RBC AUTO-ENTMCNC: 34.8 G/DL (ref 31.4–37.4)
MCV RBC AUTO: 98 FL (ref 82–98)
PLATELET # BLD AUTO: 220 THOUSANDS/UL (ref 149–390)
PMV BLD AUTO: 9.9 FL (ref 8.9–12.7)
POTASSIUM SERPL-SCNC: 3.8 MMOL/L (ref 3.5–5.3)
PROT SERPL-MCNC: 7 G/DL (ref 6.4–8.2)
RBC # BLD AUTO: 3.95 MILLION/UL (ref 3.81–5.12)
RH BLD: NEGATIVE
RH BLD: NEGATIVE
SODIUM SERPL-SCNC: 135 MMOL/L (ref 136–145)
SPECIMEN EXPIRATION DATE: NORMAL
WBC # BLD AUTO: 9.79 THOUSAND/UL (ref 4.31–10.16)

## 2022-04-17 PROCEDURE — 87591 N.GONORRHOEAE DNA AMP PROB: CPT | Performed by: OBSTETRICS & GYNECOLOGY

## 2022-04-17 PROCEDURE — 86870 RBC ANTIBODY IDENTIFICATION: CPT | Performed by: OBSTETRICS & GYNECOLOGY

## 2022-04-17 PROCEDURE — 87150 DNA/RNA AMPLIFIED PROBE: CPT | Performed by: OBSTETRICS & GYNECOLOGY

## 2022-04-17 PROCEDURE — 86850 RBC ANTIBODY SCREEN: CPT | Performed by: OBSTETRICS & GYNECOLOGY

## 2022-04-17 PROCEDURE — 86592 SYPHILIS TEST NON-TREP QUAL: CPT | Performed by: OBSTETRICS & GYNECOLOGY

## 2022-04-17 PROCEDURE — 86901 BLOOD TYPING SEROLOGIC RH(D): CPT | Performed by: OBSTETRICS & GYNECOLOGY

## 2022-04-17 PROCEDURE — 85027 COMPLETE CBC AUTOMATED: CPT | Performed by: OBSTETRICS & GYNECOLOGY

## 2022-04-17 PROCEDURE — 4A1HXCZ MONITORING OF PRODUCTS OF CONCEPTION, CARDIAC RATE, EXTERNAL APPROACH: ICD-10-PCS | Performed by: OBSTETRICS & GYNECOLOGY

## 2022-04-17 PROCEDURE — 80053 COMPREHEN METABOLIC PANEL: CPT | Performed by: OBSTETRICS & GYNECOLOGY

## 2022-04-17 PROCEDURE — 86900 BLOOD TYPING SEROLOGIC ABO: CPT | Performed by: OBSTETRICS & GYNECOLOGY

## 2022-04-17 PROCEDURE — 87491 CHLMYD TRACH DNA AMP PROBE: CPT | Performed by: OBSTETRICS & GYNECOLOGY

## 2022-04-17 PROCEDURE — NC001 PR NO CHARGE: Performed by: OBSTETRICS & GYNECOLOGY

## 2022-04-17 RX ORDER — CLINDAMYCIN PHOSPHATE 900 MG/50ML
900 INJECTION INTRAVENOUS EVERY 8 HOURS
Status: DISCONTINUED | OUTPATIENT
Start: 2022-04-17 | End: 2022-04-18

## 2022-04-17 RX ORDER — LANOLIN ALCOHOL/MO/W.PET/CERES
3 CREAM (GRAM) TOPICAL
Status: DISCONTINUED | OUTPATIENT
Start: 2022-04-17 | End: 2022-04-22 | Stop reason: HOSPADM

## 2022-04-17 RX ORDER — ACETAMINOPHEN 325 MG/1
650 TABLET ORAL EVERY 6 HOURS PRN
Status: DISCONTINUED | OUTPATIENT
Start: 2022-04-17 | End: 2022-04-18

## 2022-04-17 RX ORDER — CALCIUM CARBONATE 200(500)MG
500 TABLET,CHEWABLE ORAL 2 TIMES DAILY PRN
Status: DISCONTINUED | OUTPATIENT
Start: 2022-04-17 | End: 2022-04-18

## 2022-04-17 RX ORDER — BETAMETHASONE SODIUM PHOSPHATE AND BETAMETHASONE ACETATE 3; 3 MG/ML; MG/ML
12 INJECTION, SUSPENSION INTRA-ARTICULAR; INTRALESIONAL; INTRAMUSCULAR; SOFT TISSUE EVERY 24 HOURS
Status: COMPLETED | OUTPATIENT
Start: 2022-04-17 | End: 2022-04-18

## 2022-04-17 RX ORDER — SODIUM CHLORIDE, SODIUM LACTATE, POTASSIUM CHLORIDE, CALCIUM CHLORIDE 600; 310; 30; 20 MG/100ML; MG/100ML; MG/100ML; MG/100ML
125 INJECTION, SOLUTION INTRAVENOUS CONTINUOUS
Status: DISCONTINUED | OUTPATIENT
Start: 2022-04-17 | End: 2022-04-22 | Stop reason: HOSPADM

## 2022-04-17 RX ORDER — ONDANSETRON 2 MG/ML
4 INJECTION INTRAMUSCULAR; INTRAVENOUS EVERY 8 HOURS PRN
Status: DISCONTINUED | OUTPATIENT
Start: 2022-04-17 | End: 2022-04-18

## 2022-04-17 RX ADMIN — SODIUM CHLORIDE, SODIUM LACTATE, POTASSIUM CHLORIDE, AND CALCIUM CHLORIDE 125 ML/HR: .6; .31; .03; .02 INJECTION, SOLUTION INTRAVENOUS at 15:08

## 2022-04-17 RX ADMIN — BETAMETHASONE SODIUM PHOSPHATE AND BETAMETHASONE ACETATE 12 MG: 3; 3 INJECTION, SUSPENSION INTRA-ARTICULAR; INTRALESIONAL; INTRAMUSCULAR at 13:08

## 2022-04-17 RX ADMIN — CLINDAMYCIN IN 5 PERCENT DEXTROSE 900 MG: 18 INJECTION, SOLUTION INTRAVENOUS at 23:00

## 2022-04-17 RX ADMIN — CLINDAMYCIN IN 5 PERCENT DEXTROSE 900 MG: 18 INJECTION, SOLUTION INTRAVENOUS at 15:08

## 2022-04-17 RX ADMIN — ACETAMINOPHEN 650 MG: 325 TABLET ORAL at 21:00

## 2022-04-17 NOTE — ASSESSMENT & PLAN NOTE
/95 on admission  No other elevated BP's in prenatal care  Continue routine BP monitoring  If elevated BP recurs, consider PreE labs

## 2022-04-17 NOTE — ASSESSMENT & PLAN NOTE
Serena Glassing breech presentation on U/S on admission and confirmed this morning by TAUS  Anterior placenta

## 2022-04-17 NOTE — ASSESSMENT & PLAN NOTE
Latency abx not indicated as she is >34 weeks  WBC 9k --> 17k this AM  Denies fever, chills  Concern for developing infection, consider  section this morning  Clindamycin started for GBS unknown (PCN allergy hives)  She did have some itchiness of hands/face immediately after starting but this did resolve  Desired delayed delivery for steroid benefit     Continue continuous fetal monitoring, did have a 6 min deceleration overnight which resolved with repositioning

## 2022-04-17 NOTE — CONSULTS
Inpatient consult to Neonatology  Consult performed by: Lucero Humphries ordered by: Dr Jhon Arreola     Reason for Consult:  * PPROM at 28 + 3 weeks EGA  * Not in labor  No fevers    Inpatient prenatal consultation conducted today       HPI: Naz Wong is a 27y o  year old  female at 27w4d with an ALEXSANDRA of  2022  She was admitted to L&D today for PPROM at 35 + 3 weeks EGA  Prenatal labs include Blood type: B-, Antibody: negative, Received Rhogam at 28 weeks (3/1/22)  HIV: negative, Hepatitis B: negative, RPR: negative, Rubella: immune, Group B strep: unknown    Mother is not in active labor, and was started on a 2 dose course of Betamethasone, and clindamycin q8h for unknown GBS  Theboyd An She is being managed expectantly until planned  for breech position, tomorrow at 1PM, unless clinical condition changes       Discussed that neonatologist is available in-house at [facility]  and would participate in the management of her infant  Since infant is expected to deliver prematurely, a neonatologist would be at the delivery  At 35 weeks, though the infant would still be at risk for problems associated with late  delivery, it would be able to remain in NBN if it remains clinically well  Blood sugars and temperatures would be followed closely  Discussed delivery room and  management of  infants, including assessment and typical management of cardiorespiratory, infectious, nutritional, and thermal needs  Reassured mother that severe complications are unusual at current or anticipated EGA at delivery  Mentioned that if all is well, the baby could potentially be discharged with mother on DOL#2, but that if NICU admission is needed, the length of stay varies, but would typically be at least several days        Parents questions answered       Counseling / Coordination of Care  I spent 20 minutes in discussion with parents and 10 minutes charting   Greater than 50% of total time was spent with the patient and / or family counseling and / or coordination of care     Valentin Mortimer, MD  Neonatology

## 2022-04-17 NOTE — QUICK NOTE
Phone note:  Called by Dr Nora Escamilla to discuss management of this patient who is 35w3d with prelabor  ROM and fetal malpresentation  Discussed that while ECV is an option, it would be associated with a low chance of success given ruptured membranes, and if undertaken, should be done in OR with preparation for  if procedure failure or if nonreassuring fetal status occurred during procedure  Discussed option for latency attempt which is an option if the patient is without evidence of chorioamnionitis or abruption  If latency is chosen, inpatient management is advised with continuous external monitoring throughout  Delivery now is an option as well, by   She is likely a candidate for betamethasone course which, while not mandatory, if undertaken should not occur with tocolysis, and delivery if otherwise indicated (such as for abruption or chorio) should not be delayed for the indication of steroid administration  Later notified that the patient has chosen  delivery and does not wish to undergo ECV  Steroid course being undertaken so first dose should be given today and second dose tomorrow (24h later) with delivery to be timed 24h after that to allow steroid benefit  Penicillin is a reasonable choice given unknown GBS status  Please do not hesitate to contact me with any further questions or concerns      Cece Bourne MD

## 2022-04-17 NOTE — PLAN OF CARE
Problem: PAIN - ADULT  Goal: Verbalizes/displays adequate comfort level or baseline comfort level  Description: Interventions:  - Encourage patient to monitor pain and request assistance  - Assess pain using appropriate pain scale  - Administer analgesics based on type and severity of pain and evaluate response  - Implement non-pharmacological measures as appropriate and evaluate response  - Consider cultural and social influences on pain and pain management  - Notify physician/advanced practitioner if interventions unsuccessful or patient reports new pain  Outcome: Progressing     Problem: INFECTION - ADULT  Goal: Absence or prevention of progression during hospitalization  Description: INTERVENTIONS:  - Assess and monitor for signs and symptoms of infection  - Monitor lab/diagnostic results  - Monitor all insertion sites, i e  indwelling lines, tubes, and drains  - Monitor endotracheal if appropriate and nasal secretions for changes in amount and color  - [place] appropriate cooling/warming therapies per order  - Administer medications as ordered  - Instruct and encourage patient and family to use good hand hygiene technique  - Identify and instruct in appropriate isolation precautions for identified infection/condition  Outcome: Progressing  Goal: Absence of fever/infection during neutropenic period  Description: INTERVENTIONS:  - Monitor WBC    Outcome: Progressing     Problem: SAFETY ADULT  Goal: Patient will remain free of falls  Description: INTERVENTIONS:  - Educate patient/family on patient safety including physical limitations  - Instruct patient to call for assistance with activity   - Consult OT/PT to assist with strengthening/mobility   - Keep Call bell within reach  - Keep bed low and locked with side rails adjusted as appropriate  - Keep care items and personal belongings within reach  - Initiate and maintain comfort rounds  - Make Fall Risk Sign visible to staff    - Apply yellow socks and bracelet for high fall risk patients  - Consider moving patient to room near nurses station  Outcome: Progressing  Goal: Maintain or return to baseline ADL function  Description: INTERVENTIONS:  -  Assess patient's ability to carry out ADLs; assess patient's baseline for ADL function and identify physical deficits which impact ability to perform ADLs (bathing, care of mouth/teeth, toileting, grooming, dressing, etc )  - Assess/evaluate cause of self-care deficits   - Assess range of motion  - Assess patient's mobility; develop plan if impaired  - Assess patient's need for assistive devices and provide as appropriate  - Encourage maximum independence but intervene and supervise when necessary  - Involve family in performance of ADLs  - Assess for home care needs following discharge   - Consider OT consult to assist with ADL evaluation and planning for discharge  - Provide patient education as appropriate  Outcome: Progressing  Goal: Maintains/Returns to pre admission functional level  Description: INTERVENTIONS:  - Perform BMAT or MOVE assessment daily    - Set and communicate daily mobility goal to care team and patient/family/caregiver  - Collaborate with rehabilitation services on mobility goals if consulted    - Out of bed for toileting  - Record patient progress and toleration of activity level   Outcome: Progressing     Problem: Knowledge Deficit  Goal: Patient/family/caregiver demonstrates understanding of disease process, treatment plan, medications, and discharge instructions  Description: Complete learning assessment and assess knowledge base    Interventions:  - Provide teaching at level of understanding  - Provide teaching via preferred learning methods  Outcome: Progressing     Problem: DISCHARGE PLANNING  Goal: Discharge to home or other facility with appropriate resources  Description: INTERVENTIONS:  - Identify barriers to discharge w/patient and caregiver  - Arrange for needed discharge resources and transportation as appropriate  - Identify discharge learning needs (meds, wound care, etc )  - Arrange for interpretive services to assist at discharge as needed  - Refer to Case Management Department for coordinating discharge planning if the patient needs post-hospital services based on physician/advanced practitioner order or complex needs related to functional status, cognitive ability, or social support system  Outcome: Progressing     Problem: BIRTH - VAGINAL/ SECTION  Goal: Fetal and maternal status remain reassuring during the birth process  Description: INTERVENTIONS:  - Monitor vital signs  - Monitor fetal heart rate  - Monitor uterine activity  - Monitor labor progression (vaginal delivery)  - DVT prophylaxis  - Antibiotic prophylaxis  Outcome: Progressing  Goal: Emotionally satisfying birthing experience for mother/fetus  Description: Interventions:  - Assess, plan, implement and evaluate the nursing care given to the patient in labor  - Advocate the philosophy that each childbirth experience is a unique experience and support the family's chosen level of involvement and control during the labor process   - Actively participate in both the patient's and family's teaching of the birth process  - Consider cultural, [place]ism and age-specific factors and plan care for the patient in labor  Outcome: Progressing

## 2022-04-17 NOTE — H&P
History and Physical - Obstetrics  Martha Casillas 27 y o  female MRN: [id number]  Unit/Bed#: L&D 327-01 Encounter: [id number]    ObGyn Provider:  [address] AND PLAN:  Martha Casillas is a 27y o  year-old  at [address] [place] [address] Day: 1, admitted for PPROM, confirmed by + pooling, Nitrazine, and ferning on exam  Explained diagnosis to patient and indication for inpatient admission for delivery  Currently clinically stable, without evidence of infection or  labor  The tocometer is detecting irregular contractions but she does not feel them  The plan of care for this patient was discussed in detail with Dr Cecile Humphreys and Dr Emelyn Zabala of [facility]  Did discuss possibility of offering ECV tonight while she is stable; factors working in our favor at this time are closed cervix and normal growth of fetus  However, given that there is minimal fluid, there is likely limited chance of success  Patient was also counseled on risks of ECV, which include but are not limited to  labor, placental abruption, and fetal distress necessitating emergent delivery by  section  If ECV were to be successful, we would initiate IOL tonight, beginning with cervical ripening  If version were to be unsuccessful, and if she were to remain stable with no signs of developing infection,  labor, placental abruption, or fetal distress, patient could be candidate for delayed delivery  Offered late  betamethasone for fetal lung maturity  Patient accepts steroids  Discussed delivery could be delayed for 24h-48h to allow for steroid benefit, given that she remains stable in this time  Patient and FOB ultimately declined ECV tonight  However they are interested in the option of delayed delivery for steroid benefit  Discussed this is reasonable, as long as she remains clinically stable with no signs of developing infection,  labor, or fetal distress  Fetus is currently reactive   Patient does continue to have irregular contractions but does not feel them  By issue:    *  premature rupture of membranes (PPROM) with unknown onset of labor  Assessment & Plan  Inpatient admission  Latency antibiotics not indicated as she is > 34 weeks gestation     Lab Results   Component Value Date    WBC 9 79 2022    HGB 13 4 2022    HCT 38 5 2022    MCV 98 2022     2022     Currently without signs/symptoms of infection   Extensively counseled on options for  delivery today v delayed delivery after 24-48h for steroid benefit  Patient opting for delayed delivery   Continuous fetal monitoring overnight in the setting of delayed delivery   Patient understands we would proceed with  delivery sooner if there are concerns for cervical dilation,  labor, placental abruption, or infection  Repeat labs in AM   Reassess plan at 24h to see if candidate to delay delivery full 48h for steroid benefit   GBS unknown - GBS swab collected  Per MFM reasonable to administer Clindamycin for ppx for GBS unknown status (PCN allergic - hives)    35 weeks gestation of pregnancy  Assessment & Plan  BTM x 2 for fetal lung maturity   NICU consult   Continuous fetal monitoring     Ti breech presentation  Assessment & Plan  Ti breech presentation on U/S on admission   Anterior placenta   Fetal buttocks is presenting part; fetal head in right upper quadrant, spine is up, extremities in left upper quadrant  BESSY 4-5 cm on admission   After extensive counseling on procedure, risks, and lower chance of success, patient offered ECV tonight, but ultimately declined  Opting for 1LTCS     Elevated BP without diagnosis of hypertension  Assessment & Plan  /95 on admission  No other elevated BP's in prenatal care  Continue routine BP monitoring  If elevated BP recurs, consider PreE labs       The above assessment and plan was discussed with the admitting provider, Dr Delilah Martinez  Plan of care was also discussed with Dr Christian Chaney of [facility]  Expected LOS: >2 Midnights  Admission: INPATIENT     SUBJECTIVE:  Chief Complaint:  My water broke     History of Present Illness:  Theresa Stinson is a 27 y o   female at 30w2d, ALEXSANDRA 2022, by Last Menstrual Period, Hospital Day: 1, who presents with clear, odorless leakage of fluid since 0830 today  Noticed big gush of fluid, followed by continuous leakage for the next 2 hours  Denies fever, chills, nausea, vomiting  Denies vaginal bleeding, contractions  Feels good fetal movement  Patient denies any major complications in her pregnancy  Has history of anxiety for which she used to take bupropion but self-discontinued during pregnancy  Patient is PCN allergic, gets hives  Has tolerated other antibiotics in the past      Review of Systems   Constitutional: Negative for chills and fever  Eyes: Negative for visual disturbance  Respiratory: Negative for chest tightness and shortness of breath  Cardiovascular: Negative for chest pain and palpitations  Gastrointestinal: Negative for abdominal pain  Genitourinary: Negative for dysuria, pelvic pain, vaginal bleeding and vaginal discharge  Skin: Negative for pallor  Neurological: Negative for dizziness, light-headedness and headaches  Psychiatric/Behavioral: Negative for confusion  Current Pregnancy Problems:  Problem    Premature Rupture of Membranes (Pprom) With Unknown Onset of Labor   35 Weeks Gestation of Pregnancy   Derrill Jacobo Breech Presentation   Rh Negative Status During Pregnancy    Received Rhogam at 28 weeks (3/1/22)     Elevated Bp Without Diagnosis of Hypertension   Anxiety    Took Bupropion before pregnancy, self-discontinued during pregnancy   Mood stable     Acne   Celiac Disease    No current medications         Past Obstetric History:   Patient's last menstrual period was 2021 (exact date)     OB History    Para Term  AB Living   1 0 0 0 0 0   SAB IAB Ectopic Multiple Live Births   0 0 0 0 0      # Outcome Date GA Lbr Mitch/2nd Weight Sex Delivery Anes PTL Lv   1 Current                Pregnancy Plan:  Pregnancy: Terry Spencer  Sex: Male  Support person: Heather Dean     Post-Delivery Plans  Feeding intentions: Breast Milk  Circumcision requested: Provider Performed    HISTORICAL INFORMATION:  Past Medical History:   Diagnosis Date    Celiac disease      Past Surgical History:   Procedure Laterality Date    EGD      KNEE SURGERY       Social History   Alcohol use: no  Tobacco use: no  Other substance use: no    Other: no    Family History   Problem Relation Age of Onset    Celiac disease Mother     Colon cancer Maternal Grandfather     Lung cancer Maternal Grandfather        Allergies: Allergies   Allergen Reactions    Gluten Meal - Food Allergy      Other reaction(s): GI issues    Penicillins Hives       Current Medications:  Current Outpatient Medications   Medication Instructions    buPROPion (WELLBUTRIN XL) 150 mg 24 hr tablet TAKE 1 TABLET BY MOUTH EVERY DAY    doxycycline (ORACEA) 40 MG capsule No dose, route, or frequency recorded   spironolactone (ALDACTONE) 100 mg, Oral, Daily    TRI FEMYNOR 0 18/0 215/0 25 MG-35 MCG per tablet 1 tablet, Oral, Daily, As directed       OBJECTIVE:  Vitals:  Patient Vitals for the past 24 hrs:   BP Temp Temp src Pulse Resp Height Weight   04/17/22 1729 -- 98 4 °F (36 9 °C) -- -- -- -- --   04/17/22 1510 -- 98 5 °F (36 9 °C) Axillary -- -- -- --   04/17/22 1339 129/81 97 8 °F (36 6 °C) Oral 57 -- -- --   04/17/22 1046 141/95 98 5 °F (36 9 °C) Oral 69 18 5' 4" (1 626 m) 71 2 kg (157 lb)     Body mass index is 26 95 kg/m²  Invasive Devices  Report    Peripheral Intravenous Line            Peripheral IV 04/17/22 Dorsal (posterior); Left Hand <1 day                Physical Exam  Constitutional:       General: She is not in acute distress  Appearance: She is normal weight   She is not ill-appearing or diaphoretic  HENT:      Head: Normocephalic and atraumatic  Eyes:      Conjunctiva/sclera: Conjunctivae normal       Pupils: Pupils are equal, round, and reactive to light  Cardiovascular:      Rate and Rhythm: Normal rate  Pulmonary:      Effort: Pulmonary effort is normal  No respiratory distress  Abdominal:      General: There is distension (gravid)  Palpations: Abdomen is soft  Tenderness: There is no abdominal tenderness  There is no guarding  Genitourinary:     Comments: Normal appearing external genitalia, vaginal mucosa    + Pooling of clear fluid in the vaginal vault  No evidence of discharge or vaginal bleeding  Cervix visually appears 0 5 cm dilated  Musculoskeletal:         General: Normal range of motion  Cervical back: Normal range of motion  Skin:     General: Skin is warm and dry  Coloration: Skin is not pale  Findings: No rash  Neurological:      General: No focal deficit present  Mental Status: She is alert and oriented to person, place, and time  Mental status is at baseline  Psychiatric:         Mood and Affect: Mood normal          Behavior: Behavior normal          Thought Content: Thought content normal        Cervical Exam:    Cervical Dilation: Closed  Cervical Effacement: 60  Cervical Consistency: Soft  Fetal Station: -2  Presentation: Vertex  Position: Unknown  Method: Manual  OB Examiner: Jason Kim    Estimated fetal weight: 6 5-7lb on [place]  Presentation: yun breech, confirmed by bedside ultrasound  Fetal vertex on upper maternal right side; buttocks above the cervix  Extremities in upper maternal left side  Spine up      Membranes: grossly ruptured; + pooling, + Nitrazine, + ferning  Placenta: appears posterior     Fetal Heart Tracing:  FHT:   Baseline Rate: 135 bpm  Variability: Moderate 6-25 bpm  Accelerations: 15 x 15 or greater  Decelerations: None  FHR Category: Category I    [place]:  Contraction Frequency (minutes): irregular  Contraction Duration (seconds): 60-90  Contraction Quality: Mild    Prenatal Labs:  Blood type: B-  Antibody: negative  HIV: negative   Hepatitis B: negative  RPR: negative  Rubella: immune  Varicella: immune  Gonorrhea/Chlamydia: negative   1 hour Glucose: 91  Pap smear: + HPV (11/18/2021)  Group B strep: unknown    Other pertinent admission labs:  Recent Results (from the past 24 hour(s))   Type and screen    Collection Time: 04/17/22  1:23 PM   Result Value Ref Range    ABO Grouping B     Rh Factor Negative     Antibody Screen Positive     Specimen Expiration Date 20220420    Comprehensive metabolic panel    Collection Time: 04/17/22  1:23 PM   Result Value Ref Range    Sodium 135 (L) 136 - 145 mmol/L    Potassium 3 8 3 5 - 5 3 mmol/L    Chloride 102 100 - 108 mmol/L    CO2 24 21 - 32 mmol/L    ANION GAP 9 4 - 13 mmol/L    BUN 9 5 - 25 mg/dL    Creatinine 0 67 0 60 - 1 30 mg/dL    Glucose 76 65 - 140 mg/dL    Calcium 9 3 8 3 - 10 1 mg/dL    Corrected Calcium 10 3 (H) 8 3 - 10 1 mg/dL    AST 17 5 - 45 U/L    ALT 20 12 - 78 U/L    Alkaline Phosphatase 164 (H) 46 - 116 U/L    Total Protein 7 0 6 4 - 8 2 g/dL    Albumin 2 8 (L) 3 5 - 5 0 g/dL    Total Bilirubin 0 27 0 20 - 1 00 mg/dL    eGFR 116 ml/min/1 73sq m   CBC    Collection Time: 04/17/22  1:23 PM   Result Value Ref Range    WBC 9 79 4 31 - 10 16 Thousand/uL    RBC 3 95 3 81 - 5 12 Million/uL    Hemoglobin 13 4 11 5 - 15 4 g/dL    Hematocrit 38 5 34 8 - 46 1 %    MCV 98 82 - 98 fL    MCH 33 9 26 8 - 34 3 pg    MCHC 34 8 31 4 - 37 4 g/dL    RDW 11 9 11 6 - 15 1 %    Platelets 632 487 - 387 Thousands/uL    MPV 9 9 8 9 - 12 7 fL   ABO/Rh    Collection Time: 04/17/22  1:23 PM   Result Value Ref Range    ABO Grouping B     Rh Factor Negative    Antibody identification    Collection Time: 04/17/22  1:23 PM   Result Value Ref Range    ANTIBODY ID   #1 Passive D Antibody, Patient Received RHIG      Imaging, EKG, Pathology, and Other Studies: I have personally reviewed pertinent reports          Halima Mendoza MD  OB/GYN, PGY-3  4/17/2022  6:21 PM

## 2022-04-18 ENCOUNTER — ANESTHESIA (INPATIENT)
Dept: LABOR AND DELIVERY | Facility: HOSPITAL | Age: 32
End: 2022-04-18
Payer: COMMERCIAL

## 2022-04-18 PROBLEM — Z98.891 S/P PRIMARY LOW TRANSVERSE C-SECTION: Status: ACTIVE | Noted: 2022-04-18

## 2022-04-18 LAB
ALBUMIN SERPL BCP-MCNC: 2.5 G/DL (ref 3.5–5)
ALP SERPL-CCNC: 146 U/L (ref 46–116)
ALT SERPL W P-5'-P-CCNC: 21 U/L (ref 12–78)
ANION GAP SERPL CALCULATED.3IONS-SCNC: 11 MMOL/L (ref 4–13)
AST SERPL W P-5'-P-CCNC: 15 U/L (ref 5–45)
BASE EXCESS BLDCOA CALC-SCNC: -5.8 MMOL/L (ref 3–11)
BASE EXCESS BLDCOV CALC-SCNC: -3.2 MMOL/L (ref 1–9)
BILIRUB SERPL-MCNC: 0.24 MG/DL (ref 0.2–1)
BUN SERPL-MCNC: 9 MG/DL (ref 5–25)
CALCIUM ALBUM COR SERPL-MCNC: 10.4 MG/DL (ref 8.3–10.1)
CALCIUM SERPL-MCNC: 9.2 MG/DL (ref 8.3–10.1)
CHLORIDE SERPL-SCNC: 102 MMOL/L (ref 100–108)
CO2 SERPL-SCNC: 21 MMOL/L (ref 21–32)
CREAT SERPL-MCNC: 0.68 MG/DL (ref 0.6–1.3)
ERYTHROCYTE [DISTWIDTH] IN BLOOD BY AUTOMATED COUNT: 11.8 % (ref 11.6–15.1)
GFR SERPL CREATININE-BSD FRML MDRD: 116 ML/MIN/1.73SQ M
GLUCOSE SERPL-MCNC: 112 MG/DL (ref 65–140)
HCO3 BLDCOA-SCNC: 21.8 MMOL/L (ref 17.3–27.3)
HCO3 BLDCOV-SCNC: 22.6 MMOL/L (ref 12.2–28.6)
HCT VFR BLD AUTO: 35 % (ref 34.8–46.1)
HGB BLD-MCNC: 12.2 G/DL (ref 11.5–15.4)
MCH RBC QN AUTO: 34.2 PG (ref 26.8–34.3)
MCHC RBC AUTO-ENTMCNC: 34.9 G/DL (ref 31.4–37.4)
MCV RBC AUTO: 98 FL (ref 82–98)
O2 CT VFR BLDCOA CALC: 7.9 ML/DL
OXYHGB MFR BLDCOA: 37.4 %
OXYHGB MFR BLDCOV: 41.1 %
PCO2 BLDCOA: 50.6 MM[HG] (ref 30–60)
PCO2 BLDCOV: 43.2 MM HG (ref 27–43)
PH BLDCOA: 7.25 [PH] (ref 7.23–7.43)
PH BLDCOV: 7.34 [PH] (ref 7.19–7.49)
PLATELET # BLD AUTO: 200 THOUSANDS/UL (ref 149–390)
PMV BLD AUTO: 10.2 FL (ref 8.9–12.7)
PO2 BLDCOA: 18.9 MM HG (ref 5–25)
PO2 BLDCOV: 18.6 MM HG (ref 15–45)
POTASSIUM SERPL-SCNC: 4 MMOL/L (ref 3.5–5.3)
PROT SERPL-MCNC: 6.2 G/DL (ref 6.4–8.2)
RBC # BLD AUTO: 3.57 MILLION/UL (ref 3.81–5.12)
RPR SER QL: NORMAL
SAO2 % BLDCOV: 8.4 ML/DL
SODIUM SERPL-SCNC: 134 MMOL/L (ref 136–145)
WBC # BLD AUTO: 17.85 THOUSAND/UL (ref 4.31–10.16)

## 2022-04-18 PROCEDURE — 88307 TISSUE EXAM BY PATHOLOGIST: CPT | Performed by: PATHOLOGY

## 2022-04-18 PROCEDURE — 85027 COMPLETE CBC AUTOMATED: CPT | Performed by: OBSTETRICS & GYNECOLOGY

## 2022-04-18 PROCEDURE — NC001 PR NO CHARGE: Performed by: OBSTETRICS & GYNECOLOGY

## 2022-04-18 PROCEDURE — 99204 OFFICE O/P NEW MOD 45 MIN: CPT

## 2022-04-18 PROCEDURE — 94762 N-INVAS EAR/PLS OXIMTRY CONT: CPT

## 2022-04-18 PROCEDURE — 80053 COMPREHEN METABOLIC PANEL: CPT | Performed by: OBSTETRICS & GYNECOLOGY

## 2022-04-18 PROCEDURE — 82805 BLOOD GASES W/O2 SATURATION: CPT | Performed by: OBSTETRICS & GYNECOLOGY

## 2022-04-18 RX ORDER — ONDANSETRON 2 MG/ML
4 INJECTION INTRAMUSCULAR; INTRAVENOUS EVERY 4 HOURS PRN
Status: ACTIVE | OUTPATIENT
Start: 2022-04-18 | End: 2022-04-19

## 2022-04-18 RX ORDER — FENTANYL CITRATE 50 UG/ML
INJECTION, SOLUTION INTRAMUSCULAR; INTRAVENOUS AS NEEDED
Status: DISCONTINUED | OUTPATIENT
Start: 2022-04-18 | End: 2022-04-18

## 2022-04-18 RX ORDER — SENNOSIDES 8.8 MG/5ML
8.8 LIQUID ORAL
Status: DISCONTINUED | OUTPATIENT
Start: 2022-04-18 | End: 2022-04-22 | Stop reason: HOSPADM

## 2022-04-18 RX ORDER — ACETAMINOPHEN 325 MG/1
650 TABLET ORAL EVERY 6 HOURS SCHEDULED
Status: DISPENSED | OUTPATIENT
Start: 2022-04-18 | End: 2022-04-22

## 2022-04-18 RX ORDER — METOCLOPRAMIDE HYDROCHLORIDE 5 MG/ML
5 INJECTION INTRAMUSCULAR; INTRAVENOUS EVERY 6 HOURS PRN
Status: ACTIVE | OUTPATIENT
Start: 2022-04-18 | End: 2022-04-19

## 2022-04-18 RX ORDER — OXYTOCIN/RINGER'S LACTATE 30/500 ML
PLASTIC BAG, INJECTION (ML) INTRAVENOUS CONTINUOUS PRN
Status: DISCONTINUED | OUTPATIENT
Start: 2022-04-18 | End: 2022-04-18

## 2022-04-18 RX ORDER — DEXAMETHASONE SODIUM PHOSPHATE 4 MG/ML
8 INJECTION, SOLUTION INTRA-ARTICULAR; INTRALESIONAL; INTRAMUSCULAR; INTRAVENOUS; SOFT TISSUE ONCE AS NEEDED
Status: ACTIVE | OUTPATIENT
Start: 2022-04-18 | End: 2022-04-19

## 2022-04-18 RX ORDER — DOCUSATE SODIUM 100 MG/1
100 CAPSULE, LIQUID FILLED ORAL 2 TIMES DAILY
Status: DISCONTINUED | OUTPATIENT
Start: 2022-04-18 | End: 2022-04-22 | Stop reason: HOSPADM

## 2022-04-18 RX ORDER — EPHEDRINE SULFATE 50 MG/ML
INJECTION INTRAVENOUS AS NEEDED
Status: DISCONTINUED | OUTPATIENT
Start: 2022-04-18 | End: 2022-04-18

## 2022-04-18 RX ORDER — MORPHINE SULFATE 0.5 MG/ML
INJECTION, SOLUTION EPIDURAL; INTRATHECAL; INTRAVENOUS
Status: DISPENSED
Start: 2022-04-18 | End: 2022-04-19

## 2022-04-18 RX ORDER — NALOXONE HYDROCHLORIDE 0.4 MG/ML
0.1 INJECTION, SOLUTION INTRAMUSCULAR; INTRAVENOUS; SUBCUTANEOUS
Status: ACTIVE | OUTPATIENT
Start: 2022-04-18 | End: 2022-04-19

## 2022-04-18 RX ORDER — MORPHINE SULFATE 1 MG/ML
INJECTION, SOLUTION EPIDURAL; INTRATHECAL; INTRAVENOUS AS NEEDED
Status: DISCONTINUED | OUTPATIENT
Start: 2022-04-18 | End: 2022-04-18

## 2022-04-18 RX ORDER — SIMETHICONE 80 MG
80 TABLET,CHEWABLE ORAL 4 TIMES DAILY PRN
Status: DISCONTINUED | OUTPATIENT
Start: 2022-04-18 | End: 2022-04-22 | Stop reason: HOSPADM

## 2022-04-18 RX ORDER — OXYTOCIN/RINGER'S LACTATE 30/500 ML
62.5 PLASTIC BAG, INJECTION (ML) INTRAVENOUS ONCE
Status: COMPLETED | OUTPATIENT
Start: 2022-04-18 | End: 2022-04-19

## 2022-04-18 RX ORDER — FENTANYL CITRATE 50 UG/ML
INJECTION, SOLUTION INTRAMUSCULAR; INTRAVENOUS
Status: COMPLETED
Start: 2022-04-18 | End: 2022-04-18

## 2022-04-18 RX ORDER — ONDANSETRON 2 MG/ML
INJECTION INTRAMUSCULAR; INTRAVENOUS AS NEEDED
Status: DISCONTINUED | OUTPATIENT
Start: 2022-04-18 | End: 2022-04-18

## 2022-04-18 RX ORDER — KETOROLAC TROMETHAMINE 30 MG/ML
30 INJECTION, SOLUTION INTRAMUSCULAR; INTRAVENOUS EVERY 6 HOURS
Status: COMPLETED | OUTPATIENT
Start: 2022-04-18 | End: 2022-04-19

## 2022-04-18 RX ORDER — IBUPROFEN 600 MG/1
600 TABLET ORAL EVERY 6 HOURS PRN
Status: DISCONTINUED | OUTPATIENT
Start: 2022-04-19 | End: 2022-04-22 | Stop reason: HOSPADM

## 2022-04-18 RX ORDER — NALBUPHINE HCL 10 MG/ML
2 AMPUL (ML) INJECTION EVERY 4 HOURS PRN
Status: ACTIVE | OUTPATIENT
Start: 2022-04-18 | End: 2022-04-19

## 2022-04-18 RX ORDER — ONDANSETRON 2 MG/ML
4 INJECTION INTRAMUSCULAR; INTRAVENOUS EVERY 8 HOURS PRN
Status: DISCONTINUED | OUTPATIENT
Start: 2022-04-19 | End: 2022-04-22 | Stop reason: HOSPADM

## 2022-04-18 RX ORDER — OXYCODONE HYDROCHLORIDE 5 MG/1
10 TABLET ORAL EVERY 4 HOURS PRN
Status: DISCONTINUED | OUTPATIENT
Start: 2022-04-19 | End: 2022-04-22 | Stop reason: HOSPADM

## 2022-04-18 RX ORDER — BUPROPION HYDROCHLORIDE 150 MG/1
150 TABLET ORAL DAILY
Status: DISCONTINUED | OUTPATIENT
Start: 2022-04-19 | End: 2022-04-21

## 2022-04-18 RX ORDER — CALCIUM CARBONATE 200(500)MG
1000 TABLET,CHEWABLE ORAL DAILY PRN
Status: DISCONTINUED | OUTPATIENT
Start: 2022-04-18 | End: 2022-04-22 | Stop reason: HOSPADM

## 2022-04-18 RX ORDER — DIPHENHYDRAMINE HYDROCHLORIDE 50 MG/ML
25 INJECTION INTRAMUSCULAR; INTRAVENOUS EVERY 6 HOURS PRN
Status: DISCONTINUED | OUTPATIENT
Start: 2022-04-19 | End: 2022-04-21

## 2022-04-18 RX ORDER — DIPHENHYDRAMINE HYDROCHLORIDE 50 MG/ML
25 INJECTION INTRAMUSCULAR; INTRAVENOUS EVERY 6 HOURS PRN
Status: DISPENSED | OUTPATIENT
Start: 2022-04-18 | End: 2022-04-19

## 2022-04-18 RX ORDER — DIAPER,BRIEF,INFANT-TODD,DISP
1 EACH MISCELLANEOUS DAILY PRN
Status: DISCONTINUED | OUTPATIENT
Start: 2022-04-18 | End: 2022-04-22 | Stop reason: HOSPADM

## 2022-04-18 RX ORDER — BUPIVACAINE HYDROCHLORIDE 7.5 MG/ML
INJECTION, SOLUTION INTRASPINAL AS NEEDED
Status: DISCONTINUED | OUTPATIENT
Start: 2022-04-18 | End: 2022-04-18

## 2022-04-18 RX ORDER — OXYCODONE HYDROCHLORIDE 5 MG/1
5 TABLET ORAL EVERY 4 HOURS PRN
Status: DISCONTINUED | OUTPATIENT
Start: 2022-04-19 | End: 2022-04-22 | Stop reason: HOSPADM

## 2022-04-18 RX ORDER — OXYCODONE HYDROCHLORIDE AND ACETAMINOPHEN 5; 325 MG/1; MG/1
1 TABLET ORAL EVERY 6 HOURS PRN
Status: ACTIVE | OUTPATIENT
Start: 2022-04-18 | End: 2022-04-19

## 2022-04-18 RX ADMIN — CLINDAMYCIN IN 5 PERCENT DEXTROSE 900 MG: 18 INJECTION, SOLUTION INTRAVENOUS at 15:13

## 2022-04-18 RX ADMIN — MELATONIN 3 MG: at 00:07

## 2022-04-18 RX ADMIN — ONDANSETRON 4 MG: 2 INJECTION INTRAMUSCULAR; INTRAVENOUS at 18:39

## 2022-04-18 RX ADMIN — BETAMETHASONE SODIUM PHOSPHATE AND BETAMETHASONE ACETATE 12 MG: 3; 3 INJECTION, SUSPENSION INTRA-ARTICULAR; INTRALESIONAL; INTRAMUSCULAR at 13:18

## 2022-04-18 RX ADMIN — FENTANYL CITRATE 10 MCG: 50 INJECTION INTRAMUSCULAR; INTRAVENOUS at 18:48

## 2022-04-18 RX ADMIN — SODIUM CHLORIDE, SODIUM LACTATE, POTASSIUM CHLORIDE, AND CALCIUM CHLORIDE 999 ML/HR: .6; .31; .03; .02 INJECTION, SOLUTION INTRAVENOUS at 13:33

## 2022-04-18 RX ADMIN — AZITHROMYCIN MONOHYDRATE 500 MG: 500 INJECTION, POWDER, LYOPHILIZED, FOR SOLUTION INTRAVENOUS at 18:39

## 2022-04-18 RX ADMIN — SODIUM CHLORIDE, SODIUM LACTATE, POTASSIUM CHLORIDE, AND CALCIUM CHLORIDE 125 ML/HR: .6; .31; .03; .02 INJECTION, SOLUTION INTRAVENOUS at 17:48

## 2022-04-18 RX ADMIN — EPHEDRINE SULFATE 10 MG: 50 INJECTION, SOLUTION INTRAVENOUS at 18:57

## 2022-04-18 RX ADMIN — CLINDAMYCIN IN 5 PERCENT DEXTROSE 900 MG: 18 INJECTION, SOLUTION INTRAVENOUS at 07:13

## 2022-04-18 RX ADMIN — GENTAMICIN SULFATE 270 MG: 40 INJECTION, SOLUTION INTRAMUSCULAR; INTRAVENOUS at 17:51

## 2022-04-18 RX ADMIN — BUPIVACAINE HYDROCHLORIDE IN DEXTROSE 1.4 ML: 7.5 INJECTION, SOLUTION SUBARACHNOID at 18:54

## 2022-04-18 RX ADMIN — SODIUM CHLORIDE, SODIUM LACTATE, POTASSIUM CHLORIDE, AND CALCIUM CHLORIDE 125 ML/HR: .6; .31; .03; .02 INJECTION, SOLUTION INTRAVENOUS at 08:16

## 2022-04-18 RX ADMIN — MORPHINE SULFATE 0.2 MG: 1 INJECTION, SOLUTION EPIDURAL; INTRATHECAL; INTRAVENOUS at 18:48

## 2022-04-18 RX ADMIN — SODIUM CHLORIDE, SODIUM LACTATE, POTASSIUM CHLORIDE, AND CALCIUM CHLORIDE 125 ML/HR: .6; .31; .03; .02 INJECTION, SOLUTION INTRAVENOUS at 05:33

## 2022-04-18 RX ADMIN — Medication 250 MILLI-UNITS/MIN: at 19:12

## 2022-04-18 RX ADMIN — ACETAMINOPHEN 325MG 650 MG: 325 TABLET ORAL at 22:57

## 2022-04-18 RX ADMIN — MELATONIN 3 MG: at 22:57

## 2022-04-18 RX ADMIN — SENNOSIDES 8.8 MG: 8.8 SYRUP ORAL at 22:59

## 2022-04-18 RX ADMIN — Medication 62.5 MILLI-UNITS/MIN: at 20:41

## 2022-04-18 RX ADMIN — KETOROLAC TROMETHAMINE 30 MG: 30 INJECTION, SOLUTION INTRAMUSCULAR at 20:34

## 2022-04-18 NOTE — PLAN OF CARE
Problem: PAIN - ADULT  Goal: Verbalizes/displays adequate comfort level or baseline comfort level  Description: Interventions:  - Encourage patient to monitor pain and request assistance  - Assess pain using appropriate pain scale  - Administer analgesics based on type and severity of pain and evaluate response  - Implement non-pharmacological measures as appropriate and evaluate response  - Consider cultural and social influences on pain and pain management  - Notify physician/advanced practitioner if interventions unsuccessful or patient reports new pain  Outcome: Progressing     Problem: INFECTION - ADULT  Goal: Absence or prevention of progression during hospitalization  Description: INTERVENTIONS:  - Assess and monitor for signs and symptoms of infection  - Monitor lab/diagnostic results  - Monitor all insertion sites, i e  indwelling lines, tubes, and drains  - Monitor endotracheal if appropriate and nasal secretions for changes in amount and color  - [place] appropriate cooling/warming therapies per order  - Administer medications as ordered  - Instruct and encourage patient and family to use good hand hygiene technique  - Identify and instruct in appropriate isolation precautions for identified infection/condition  Outcome: Progressing  Goal: Absence of fever/infection during neutropenic period  Description: INTERVENTIONS:  - Monitor WBC    Outcome: Progressing     Problem: SAFETY ADULT  Goal: Patient will remain free of falls  Description: INTERVENTIONS:  - Educate patient/family on patient safety including physical limitations  - Instruct patient to call for assistance with activity   - Consult OT/PT to assist with strengthening/mobility   - Keep Call bell within reach  - Keep bed low and locked with side rails adjusted as appropriate  - Keep care items and personal belongings within reach  - Initiate and maintain comfort rounds  - Make Fall Risk Sign visible to staff    - Apply yellow socks and bracelet for high fall risk patients  - Consider moving patient to room near nurses station  Outcome: Progressing  Goal: Maintain or return to baseline ADL function  Description: INTERVENTIONS:  -  Assess patient's ability to carry out ADLs; assess patient's baseline for ADL function and identify physical deficits which impact ability to perform ADLs (bathing, care of mouth/teeth, toileting, grooming, dressing, etc )  - Assess/evaluate cause of self-care deficits   - Assess range of motion  - Assess patient's mobility; develop plan if impaired  - Assess patient's need for assistive devices and provide as appropriate  - Encourage maximum independence but intervene and supervise when necessary  - Involve family in performance of ADLs  - Assess for home care needs following discharge   - Consider OT consult to assist with ADL evaluation and planning for discharge  - Provide patient education as appropriate  Outcome: Progressing  Goal: Maintains/Returns to pre admission functional level  Description: INTERVENTIONS:  - Perform BMAT or MOVE assessment daily    - Set and communicate daily mobility goal to care team and patient/family/caregiver  - Collaborate with rehabilitation services on mobility goals if consulted  - Out of bed for toileting  - Record patient progress and toleration of activity level   Outcome: Progressing     Problem: Knowledge Deficit  Goal: Patient/family/caregiver demonstrates understanding of disease process, treatment plan, medications, and discharge instructions  Description: Complete learning assessment and assess knowledge base    Interventions:  - Provide teaching at level of understanding  - Provide teaching via preferred learning methods  Outcome: Progressing     Problem: DISCHARGE PLANNING  Goal: Discharge to home or other facility with appropriate resources  Description: INTERVENTIONS:  - Identify barriers to discharge w/patient and caregiver  - Arrange for needed discharge resources and transportation as appropriate  - Identify discharge learning needs (meds, wound care, etc )  - Arrange for interpretive services to assist at discharge as needed  - Refer to Case Management Department for coordinating discharge planning if the patient needs post-hospital services based on physician/advanced practitioner order or complex needs related to functional status, cognitive ability, or social support system  Outcome: Progressing     Problem: BIRTH - VAGINAL/ SECTION  Goal: Fetal and maternal status remain reassuring during the birth process  Description: INTERVENTIONS:  - Monitor vital signs  - Monitor fetal heart rate  - Monitor uterine activity  - Monitor labor progression (vaginal delivery)  - DVT prophylaxis  - Antibiotic prophylaxis  Outcome: Progressing  Goal: Emotionally satisfying birthing experience for mother/fetus  Description: Interventions:  - Assess, plan, implement and evaluate the nursing care given to the patient in labor  - Advocate the philosophy that each childbirth experience is a unique experience and support the family's chosen level of involvement and control during the labor process   - Actively participate in both the patient's and family's teaching of the birth process  - Consider cultural, [place] and age-specific factors and plan care for the patient in labor  Outcome: Progressing

## 2022-04-18 NOTE — ANESTHESIA PROCEDURE NOTES
Spinal Block    Patient location during procedure: OR  Start time: 4/18/2022 6:48 PM  Reason for block: primary anesthetic  Staffing  Anesthesiologist: Anju Ramirez DO  Resident/CRNA: Vane Ta CRNA  Preanesthetic Checklist  Completed: patient identified, IV checked, site marked, risks and benefits discussed, surgical consent, monitors and equipment checked, pre-op evaluation and timeout performed  Spinal Block  Patient position: sitting  Prep: Betadine  Patient monitoring: heart rate and continuous pulse ox  Approach: midline  Location: L3-4  Injection technique: single-shot and catheter  Needle  Needle gauge: 24 G  Needle length: 10 cm  Assessment  Sensory level: T4  Events: cerebrospinal fluid  Injection Assessment:  positive aspiration for clear CSF, no paresthesia on injection and negative aspiration for heme    Post-procedure:  site cleaned

## 2022-04-18 NOTE — QUICK NOTE
As part of morning report, Dr Margaux Don and I reviewed the [facility] tracing for [facility]  From 6987-3181 there appeared to be a prolonged contraction with discontinuous FHR tracing followed by a period of minimal variability until 0736  At 0736 moderate variability returned  Regarding FHR tracing and timing of delivery, the tracing does not require urgent delivery at this time  Regarding gestational age and timing of delivery, ACOG supports continuing the pregnancy to complete steroids and expectant management beyond 34 weeks (to as late as 36w 6d)  Plan to continue FHR monitoring and next dose of betamethasone at the scheduled time    Eleuterio Dakins, MD  OB/GYN  4/18/2022 8:06 AM

## 2022-04-18 NOTE — PROGRESS NOTES
Gynecology Progress note   Olegario Streeter 27 y o  female MRN: [id number]  Unit/Bed#: L&D 327-01 Encounter: [id number]    Assessment/Plan:    Ms Sameera Reese is a 27 y o  , Hospital Day: 2, admitted with PPROM at 29w2d    *  premature rupture of membranes (PPROM) with unknown onset of labor  Assessment & Plan  Latency abx not indicated as she is >34 weeks  WBC 9k --> 17k this AM  Denies fever, chills  Concern for developing infection, consider  section this morning  Clindamycin started for GBS unknown (PCN allergy hives)  She did have some itchiness of hands/face immediately after starting but this did resolve  Desired delayed delivery for steroid benefit  Continue continuous fetal monitoring, did have a 6 min deceleration overnight which resolved with repositioning      Brianne Sera breech presentation  Assessment & Plan  Ti breech presentation on U/S on admission and confirmed this morning by FINAS  Anterior placenta     Elevated BP without diagnosis of hypertension  Assessment & Plan  /95 on admission  No other elevated BP's in prenatal care  Continue routine BP monitoring  If elevated BP recurs, consider PreE labs     Rh negative status during pregnancy  Assessment & Plan  RhoGAM after delivery    35 weeks gestation of pregnancy  Assessment & Plan  BTM x 2 for fetal lung maturity   NICU consult   Continuous fetal monitoring            Subjective:    Olegario Streeter has no current complaints  She did start having some itchiness of her hands and face immediately after starting clindamycin, but this has resolved  Will continue to monitor closely  She is NPO and on maintenance fluids  Denies fever, chills, chest pain, shortness of breath, abdominal pain  Objective:  /59   Pulse 64   Temp 97 8 °F (36 6 °C) (Oral)   Resp 18   Ht 5' 4" (1 626 m)   Wt 71 2 kg (157 lb)   LMP 2021 (Exact Date)   BMI 26 95 kg/m²     No intake/output data recorded    No intake/output data recorded  Lab Results   Component Value Date    WBC 17 85 (H) 04/18/2022    HGB 12 2 04/18/2022    HCT 35 0 04/18/2022    MCV 98 04/18/2022     04/18/2022       Lab Results   Component Value Date    CALCIUM 9 2 04/18/2022    K 4 0 04/18/2022    CO2 21 04/18/2022     04/18/2022    BUN 9 04/18/2022    CREATININE 0 68 04/18/2022           Physical Exam  Vitals reviewed  Cardiovascular:      Rate and Rhythm: Normal rate  Pulmonary:      Effort: Pulmonary effort is normal  No respiratory distress  Abdominal:      General: Distension: Gravid  Genitourinary:     Comments: SVE ft/60/-2, re-scanned for breech this AM  Feels pelvic pressure but no contractions  Skin:     General: Skin is warm  Neurological:      Mental Status: She is alert and oriented to person, place, and time     Psychiatric:         Behavior: Behavior normal            Shivam Du DO  4/18/2022  6:30 AM

## 2022-04-18 NOTE — H&P
H&P Exam - Obstetrics   Violet De La Garza 27 y o  female MRN: [id number]  Unit/Bed#: L&D 327-01 Encounter: [id number]        HPI:  Violet De La Garza is a 27 y o   female with an ALEXSANDRA of 2022, by Last Menstrual Period at 35w4d weeks gestation who is admitted for primary  delivery for breech presentation  She was admitted for observation on  and this H&P is being written for administrative documentation purposes  Briefly, she had  pre-labor rupture of membranes at 35 weeks  She is now steroid complete  She was managed expectantly patient since admission  She did have 1 elevated blood pressure on admission, and all others have been normotensive since  She has been on clindamycin as she is GBS unknown, and gets hives to PCN  She has been on maintenance fluids with NPO  Her fetus has been largely category 1 all day, she did have a deceleration this morning (From 2089-4913 there appeared to be a prolonged contraction with discontinuous FHR tracing followed by a period of minimal variability until 0736  At 0736 moderate variability returned  )  Plan is for primary low transverse  section for breech presentation later this evening  Contractions: yes, does not appreciate these  Loss of fluid: yes, has been leaking since admission  Vaginal bleeding: no  Fetal movement: yes    She is a patient of [address] [place] [address] OBGYN Practices: [facility]     OB History    Para Term  AB Living   1             SAB IAB Ectopic Multiple Live Births                  # Outcome Date GA Lbr Mitch/2nd Weight Sex Delivery Anes PTL Lv   1 Current                Baby complications/comments:     Review of Systems   All other systems reviewed and are negative        Historical Information   Past Medical History:   Diagnosis Date    Celiac disease      Past Surgical History:   Procedure Laterality Date    EGD      KNEE SURGERY       Social History   Social History     Substance and Sexual Activity   Alcohol Use Yes    Comment: rarely     Social History     Substance and Sexual Activity   Drug Use Never     Social History     Tobacco Use   Smoking Status Never Smoker   Smokeless Tobacco Never Used       Meds/Allergies      Medications Prior to Admission   Medication    buPROPion (WELLBUTRIN XL) 150 mg 24 hr tablet    doxycycline (ORACEA) 40 MG capsule    spironolactone (ALDACTONE) 100 mg tablet    TRI FEMYNOR 0 18/0 215/0 25 MG-35 MCG per tablet        Allergies   Allergen Reactions    Gluten Meal - Food Allergy      Other reaction(s): GI issues    Penicillins Hives       OBJECTIVE:    Vitals:   /70   Pulse 61   Temp 97 8 °F (36 6 °C) (Oral)   Resp 18   Ht 5' 4" (1 626 m)   Wt 71 2 kg (157 lb)   LMP 08/12/2021 (Exact Date)   BMI 26 95 kg/m²   Body mass index is 26 95 kg/m²  Physical Exam    General: The patient is alert and oriented x3, pleasant well-appearing female in no acute distress     CV: Regular rate and rhythm   Lungs: CTAB, no rales/ronchi  Abdomen: Gravid, soft  Skin: warm, dry  Neuro: no focal deficits  Psych: normal affect and judgement, cooperative       Fetal heart rate:   Baseline Rate: 120 bpm  Variability: Moderate 6-25 bpm  Accelerations: 15 x 15 or greater  Decelerations: None  FHR Category: Category I    [place]:   Contraction Frequency (minutes): 8-9  Contraction Duration (seconds):   Contraction Quality: Mild    Prenatal Labs:   Blood Type:   Lab Results   Component Value Date/Time    ABO Grouping B 04/17/2022 01:23 PM    ABO Grouping B 04/17/2022 01:23 PM     , D (Rh type):   Lab Results   Component Value Date/Time    Rh Factor Negative 04/17/2022 01:23 PM    Rh Factor Negative 04/17/2022 01:23 PM     , HCT/HGB:   Lab Results   Component Value Date/Time    Hematocrit 35 0 04/18/2022 05:32 AM    Hemoglobin 12 2 04/18/2022 05:32 AM      , 1 hour Glucola:   Lab Results   Component Value Date/Time    Glucose 91 03/01/2022 12:00 AM   , Rubella: Immune VDRL/RPR:   Lab Results   Component Value Date/Time    RPR Non-Reactive 2022 02:41 PM      , Hep B:   Lab Results   Component Value Date/Time    Hepatitis B Surface Ag non-reactive 10/29/2021 12:00 AM     , HIV:   Lab Results   Component Value Date/Time    HIV-1/HIV-2 AB Non-Reactive 10/29/2021 12:00 AM     , Group B Strep:  Unknown    Invasive Devices  Report    Peripheral Intravenous Line            Peripheral IV 22 Dorsal (posterior); Left Hand 1 day    Peripheral IV 22 Left;Ventral (anterior) Forearm <1 day                  Assessment/Plan     ASSESSMENT:    Shiva De La Vega is a 27 y o   35w4d weeks gestation who is being admitted for primary  section  PLAN:   1) Admit   2) CBC, RPR, Blood Type   3) Anesthesia consult for spinal   4) Ancef 1 gm for ppx   5) D/w Dr Moo Meza        This patient will be an INPATIENT and I certify the anticipated length of stay is >2 Midnights        Ricki Fagan DO  Obstetrics & Gynecology  2022  2:57 PM

## 2022-04-18 NOTE — QUICK NOTE
Patient seen and examined at bedside during spontaneous FHR deceleration to 70s  Patient noted to be on her side at time of deceleration  Repositioned to hands and knees, IVF bolus given with recovery to baseline followed by spontaneous 15x15 acceleration  Moderate variability present  SVE unchanged 0/60/-2 with no contractions seen on tocometry  Patient denies feeling contractions/increased vaginal pressure  Dr Benita Mack and in house SOD Dr Aayush Avery notified  Will plan to monitor at this time  Patient NPO pending AM labs  Continue continuous monitoring       Rayas Diaz MD  OB/GYN PGY-2  11:20 PM  04/17/22

## 2022-04-18 NOTE — PLAN OF CARE
Problem: PAIN - ADULT  Goal: Verbalizes/displays adequate comfort level or baseline comfort level  Description: Interventions:  - Encourage patient to monitor pain and request assistance  - Assess pain using appropriate pain scale  - Administer analgesics based on type and severity of pain and evaluate response  - Implement non-pharmacological measures as appropriate and evaluate response  - Consider cultural and social influences on pain and pain management  - Notify physician/advanced practitioner if interventions unsuccessful or patient reports new pain  Outcome: Progressing     Problem: INFECTION - ADULT  Goal: Absence or prevention of progression during hospitalization  Description: INTERVENTIONS:  - Assess and monitor for signs and symptoms of infection  - Monitor lab/diagnostic results  - Monitor all insertion sites, i e  indwelling lines, tubes, and drains  - Monitor endotracheal if appropriate and nasal secretions for changes in amount and color  - [place] appropriate cooling/warming therapies per order  - Administer medications as ordered  - Instruct and encourage patient and family to use good hand hygiene technique  - Identify and instruct in appropriate isolation precautions for identified infection/condition  Outcome: Progressing  Goal: Absence of fever/infection during neutropenic period  Description: INTERVENTIONS:  - Monitor WBC    Outcome: Progressing     Problem: SAFETY ADULT  Goal: Patient will remain free of falls  Description: INTERVENTIONS:  - Educate patient/family on patient safety including physical limitations  - Instruct patient to call for assistance with activity   - Consult OT/PT to assist with strengthening/mobility   - Keep Call bell within reach  - Keep bed low and locked with side rails adjusted as appropriate  - Keep care items and personal belongings within reach  - Initiate and maintain comfort rounds  - Make Fall Risk Sign visible to staff  - Apply yellow socks and bracelet for high fall risk patients  - Consider moving patient to room near nurses station  Outcome: Progressing  Goal: Maintain or return to baseline ADL function  Description: INTERVENTIONS:  -  Assess patient's ability to carry out ADLs; assess patient's baseline for ADL function and identify physical deficits which impact ability to perform ADLs (bathing, care of mouth/teeth, toileting, grooming, dressing, etc )  - Assess/evaluate cause of self-care deficits   - Assess range of motion  - Assess patient's mobility; develop plan if impaired  - Assess patient's need for assistive devices and provide as appropriate  - Encourage maximum independence but intervene and supervise when necessary  - Involve family in performance of ADLs  - Assess for home care needs following discharge   - Consider OT consult to assist with ADL evaluation and planning for discharge  - Provide patient education as appropriate  Outcome: Progressing  Goal: Maintains/Returns to pre admission functional level  Description: INTERVENTIONS:  - Perform BMAT or MOVE assessment daily    - Set and communicate daily mobility goal to care team and patient/family/caregiver  - Collaborate with rehabilitation services on mobility goals if consulted  - Out of bed for toileting  - Record patient progress and toleration of activity level   Outcome: Progressing     Problem: Knowledge Deficit  Goal: Patient/family/caregiver demonstrates understanding of disease process, treatment plan, medications, and discharge instructions  Description: Complete learning assessment and assess knowledge base    Interventions:  - Provide teaching at level of understanding  - Provide teaching via preferred learning methods  Outcome: Progressing     Problem: DISCHARGE PLANNING  Goal: Discharge to home or other facility with appropriate resources  Description: INTERVENTIONS:  - Identify barriers to discharge w/patient and caregiver  - Arrange for needed discharge resources and transportation as appropriate  - Identify discharge learning needs (meds, wound care, etc )  - Arrange for interpretive services to assist at discharge as needed  - Refer to Case Management Department for coordinating discharge planning if the patient needs post-hospital services based on physician/advanced practitioner order or complex needs related to functional status, cognitive ability, or social support system  Outcome: Progressing     Problem: BIRTH - VAGINAL/ SECTION  Goal: Fetal and maternal status remain reassuring during the birth process  Description: INTERVENTIONS:  - Monitor vital signs  - Monitor fetal heart rate  - Monitor uterine activity  - Monitor labor progression (vaginal delivery)  - DVT prophylaxis  - Antibiotic prophylaxis  Outcome: Progressing  Goal: Emotionally satisfying birthing experience for mother/fetus  Description: Interventions:  - Assess, plan, implement and evaluate the nursing care given to the patient in labor  - Advocate the philosophy that each childbirth experience is a unique experience and support the family's chosen level of involvement and control during the labor process   - Actively participate in both the patient's and family's teaching of the birth process  - Consider cultural, [place] and age-specific factors and plan care for the patient in labor  Outcome: Progressing

## 2022-04-18 NOTE — UTILIZATION REVIEW
Initial Clinical Review    Admission: Date/Time/Statement:   Admission Orders (From admission, onward)     Ordered        22 1226  Inpatient Admission  Once                      Orders Placed This Encounter   Procedures    Inpatient Admission     Standing Status:   Standing     Number of Occurrences:   1     Order Specific Question:   Level of Care     Answer:   Med Surg [16]     Order Specific Question:   Estimated length of stay     Answer:   More than 2 Midnights     Order Specific Question:   Certification     Answer:   I certify that inpatient services are medically necessary for this patient for a duration of greater than two midnights  See H&P and MD Progress Notes for additional information about the patient's course of treatment  ED Arrival Information     Patient not seen in ED                     Chief Complaint   Patient presents with    Rupture of Membranes       Initial Presentation: 27 y o  female  female  at 35w4d weeks gestation as Inpatient admission  for primary  delivery for breech presentation w/ PPROM @ 35 wks; confirmed by + pooling, Nitrazine, and ferning on exam   s/p steroid complete & OP antibx ; Pt desires to delay of delivery for steroid benefit  Evident  1 elevated BP on admission & then normotensive  In triage deceleration w a prolonged contraction w discontinuous FHR w a period of minimal variability  S/P 2nd dose of BTM this PM  NPO  Primary C section  7:10PM  For viable male in yun breech presentation, weighing 5lbs 8 5oz;  Apgar scores of 9 & 9  Date: 2022   Day 2:  SURGERY DATE: 2022   Spinal Block  Post-operative Diagnosis:   1LTCS     Findings:  Viable male in yun breech presentation, weighing 5lbs 8 5oz;  Apgar scores of 9 at one minute and 9 at five minutes  DATE 2022 DAY#3 post-op day 1 from a , due to breech s/p PPROM   Cont routine post partum care; DC keita today  EXAM  Abdomen: abdomen is soft without significant tenderness, masses, organomegaly or guarding  Fundus: Firm, 2 cm below the umbilicus  Incision: clean, dry, and intact   Triage Vitals   Temperature Pulse Respirations Blood Pressure SpO2   04/17/22 1046 04/17/22 1046 04/17/22 1046 04/17/22 1046 04/18/22 2005   98 5 °F (36 9 °C) 69 18 141/95 97 %      Temp Source Heart Rate Source Patient Position - Orthostatic VS BP Location FiO2 (%)   04/17/22 1046 04/17/22 1046 04/17/22 1046 04/17/22 1046 --   Oral Monitor Lying Right arm       Pain Score       04/17/22 1046       No Pain          Wt Readings from Last 1 Encounters:   04/17/22 71 2 kg (157 lb)     Additional Vital Signs:   Date/Time Temp Pulse Resp BP SpO2 O2 Device Cardiac (WDL) Patient Position - Orthostatic VS   04/19/22 0700 97 8 °F (36 6 °C) 52 Abnormal  14 96/51 97 % None (Room air) WDL Lying   04/19/22 0330 97 9 °F (36 6 °C) 55 16 100/56 96 % None (Room air) -- Lying   04/19/22 0035 98 °F (36 7 °C) 68 16 104/58 95 % -- -- --   04/18/22 2335 98 4 °F (36 9 °C) 66 16 107/59 95 % None (Room air) -- Lying     04/17/22 2300 98 4 °F (36 9 °C) 64 -- 101/59 -- -- -- --   04/17/22 2059 98 3 °F (36 8 °C) -- -- -- -- -- -- --   04/17/22 1943 -- 61 -- 127/71 -- -- -- Lying   04/17/22 1939 -- 75 -- -- -- -- -- --   04/17/22 1900 98 5 °F (36 9 °C) -- -- -- -- -- -- --   04/17/22 1729 98 4 °F (36 9 °C) -- -- -- -- -- -- --   04/17/22 1510 98 5 °F (36 9 °C) -- -- -- -- -- -- --   04/17/22 1339 97 8 °F (36 6 °C) 57 -- 129/81 -- -- -- Lying   04/17/22 1046 98 5 °F (36 9 °C) 69 18 141/95 -- -- -- Lying     Weights (last 14 days)    Date/Time Weight Height   04/17/22 1046 71 2 kg (157 lb) 5' 4" (1 626 m)     Pertinent Labs/Diagnostic Test Results:   No orders to display         Results from last 7 days   Lab Units 04/19/22  0601 04/18/22  0532 04/17/22  1323   WBC Thousand/uL 17 11* 17 85* 9 79   HEMOGLOBIN g/dL 9 6* 12 2 13 4   HEMATOCRIT % 28 0* 35 0 38 5   PLATELETS Thousands/uL 187 200 220 Results from last 7 days   Lab Units 04/18/22  0532 04/17/22  1323   SODIUM mmol/L 134* 135*   POTASSIUM mmol/L 4 0 3 8   CHLORIDE mmol/L 102 102   CO2 mmol/L 21 24   ANION GAP mmol/L 11 9   BUN mg/dL 9 9   CREATININE mg/dL 0 68 0 67   EGFR ml/min/1 73sq m 116 116   CALCIUM mg/dL 9 2 9 3     Results from last 7 days   Lab Units 04/18/22  0532 04/17/22  1323   AST U/L 15 17   ALT U/L 21 20   ALK PHOS U/L 146* 164*   TOTAL PROTEIN g/dL 6 2* 7 0   ALBUMIN g/dL 2 5* 2 8*   TOTAL BILIRUBIN mg/dL 0 24 0 27         Results from last 7 days   Lab Units 04/18/22  0532 04/17/22  1323   GLUCOSE RANDOM mg/dL 112 76         ED Treatment:   Medication Administration - No Administrations Displayed (No Start Event Found)     None        Past Medical History:   Diagnosis Date    Celiac disease      Present on Admission:   Anxiety   Celiac disease   Acne      Admitting Diagnosis: Vaginal discharge during pregnancy in second trimester [O26 892, N89 8]  Age/Sex: 27 y o  female  Admission Orders:  Continuous external uterine contraction & fetal HR monitoring    Scheduled Medications:  acetaminophen, 650 mg, Oral, Q6H AMAN  buPROPion, 150 mg, Oral, Daily  docusate sodium, 100 mg, Oral, BID  ketorolac, 30 mg, Intravenous, Q6H  melatonin, 3 mg, Oral, HS  Rho(D) immune globulin, 300 mcg, Intramuscular, Once  senna, 8 8 mg, Oral, HS      Continuous IV Infusions:  lactated ringers, 125 mL/hr, Intravenous, Continuous      PRN Meds:  benzocaine-menthol-lanolin-aloe, 1 application, Topical, X4J PRN  calcium carbonate, 1,000 mg, Oral, Daily PRN  dexamethasone, 8 mg, Intravenous, Once PRN  diphenhydrAMINE, 25 mg, Intravenous, Q6H PRN  diphenhydrAMINE, 25 mg, Intravenous, Q6H PRN  hydrocortisone, 1 application, Topical, Daily PRN  ibuprofen, 600 mg, Oral, Q6H PRN  metoclopramide, 5 mg, Intravenous, Q6H PRN  nalbuphine, 2 mg, Intravenous, Q4H PRN  naloxone, 0 1 mg, Intravenous, Q3 min PRN  ondansetron, 4 mg, Intravenous, Q4H PRN  ondansetron, 4 mg, Intravenous, Q8H PRN  oxyCODONE, 10 mg, Oral, Q4H PRN  oxyCODONE, 5 mg, Oral, Q4H PRN  oxyCODONE-acetaminophen, 1 tablet, Oral, Q6H PRN  simethicone, 80 mg, Oral, 4x Daily PRN  witch hazel-glycerin, 1 pad, Topical, Q4H PRN      IP CONSULT TO ANESTHESIOLOGY  IP CONSULT TO NEONATOLOGY    Network Utilization Review Department  ATTENTION: Please call with any questions or concerns to [phone] and carefully listen to the prompts so that you are directed to the right person  All voicemails are confidential   Elysia Hao all requests for admission clinical reviews, approved or denied determinations and any other requests to dedicated fax number below belonging to the campus where the patient is receiving treatment   List of dedicated fax numbers for the Facilities:  [address] DENIALS (Administrative/Medical Necessity) [phone]   [address] (Maternity/NICU/Pediatrics) [phone]   [address]  [address] [place] [address] [address] [place] [address] [address] [facility] [place] [address] [phone]

## 2022-04-18 NOTE — ANESTHESIA PROCEDURE NOTES
Spinal Block    Patient location during procedure: OB  Start time: 4/18/2022 6:48 PM  Reason for block: at surgeon's request and primary anesthetic  Staffing  Anesthesiologist: Helena Swift DO  Preanesthetic Checklist  Completed: patient identified, IV checked, site marked, risks and benefits discussed, surgical consent, monitors and equipment checked, pre-op evaluation and timeout performed  Spinal Block  Patient position: sitting  Prep: Betadine  Patient monitoring: heart rate  Approach: midline  Location: L3-4  Injection technique: single-shot  Needle  Needle type: pencil-tip   Needle gauge: 22 G  Needle length: 10 cm  Assessment  Sensory level: T4  Injection Assessment:  negative aspiration for heme, no paresthesia on injection and positive aspiration for clear CSF    Post-procedure:  site cleaned

## 2022-04-18 NOTE — ANESTHESIA PREPROCEDURE EVALUATION
Procedure:   SECTION () (N/A Uterus)    Relevant Problems   GYN   (+) 35 weeks gestation of pregnancy      NEURO/PSYCH   (+) Anxiety        Physical Exam    Airway    Mallampati score: III  TM Distance: >3 FB  Neck ROM: full     Dental   No notable dental hx     Cardiovascular  Rhythm: regular, Rate: normal, Cardiovascular exam normal    Pulmonary  Pulmonary exam normal Breath sounds clear to auscultation,     Other Findings        Anesthesia Plan  ASA Score- 2     Anesthesia Type- spinal with ASA Monitors  Additional Monitors:   Airway Plan:           Plan Factors-Exercise tolerance (METS): >4 METS  Chart reviewed  Existing labs reviewed  Patient summary reviewed  Patient is not a current smoker  Induction-     Postoperative Plan- Plan for postoperative opioid use  Informed Consent- Anesthetic plan and risks discussed with patient

## 2022-04-19 LAB
ABO GROUP BLD: NORMAL
BLD GP AB SCN SERPL QL: POSITIVE
C TRACH DNA SPEC QL NAA+PROBE: NEGATIVE
ERYTHROCYTE [DISTWIDTH] IN BLOOD BY AUTOMATED COUNT: 11.9 % (ref 11.6–15.1)
FETAL CELL SCN BLD QL ROSETTE: NEGATIVE
HCT VFR BLD AUTO: 28 % (ref 34.8–46.1)
HGB BLD-MCNC: 9.6 G/DL (ref 11.5–15.4)
MCH RBC QN AUTO: 34.4 PG (ref 26.8–34.3)
MCHC RBC AUTO-ENTMCNC: 34.3 G/DL (ref 31.4–37.4)
MCV RBC AUTO: 100 FL (ref 82–98)
N GONORRHOEA DNA SPEC QL NAA+PROBE: NEGATIVE
PLATELET # BLD AUTO: 187 THOUSANDS/UL (ref 149–390)
PMV BLD AUTO: 10.4 FL (ref 8.9–12.7)
RBC # BLD AUTO: 2.79 MILLION/UL (ref 3.81–5.12)
RH BLD: NEGATIVE
WBC # BLD AUTO: 17.11 THOUSAND/UL (ref 4.31–10.16)

## 2022-04-19 PROCEDURE — 86850 RBC ANTIBODY SCREEN: CPT | Performed by: OBSTETRICS & GYNECOLOGY

## 2022-04-19 PROCEDURE — NC001 PR NO CHARGE: Performed by: OBSTETRICS & GYNECOLOGY

## 2022-04-19 PROCEDURE — 85461 HEMOGLOBIN FETAL: CPT | Performed by: OBSTETRICS & GYNECOLOGY

## 2022-04-19 PROCEDURE — 86900 BLOOD TYPING SEROLOGIC ABO: CPT | Performed by: OBSTETRICS & GYNECOLOGY

## 2022-04-19 PROCEDURE — 86901 BLOOD TYPING SEROLOGIC RH(D): CPT | Performed by: OBSTETRICS & GYNECOLOGY

## 2022-04-19 PROCEDURE — 85027 COMPLETE CBC AUTOMATED: CPT | Performed by: OBSTETRICS & GYNECOLOGY

## 2022-04-19 RX ADMIN — SENNOSIDES 8.8 MG: 8.8 SYRUP ORAL at 22:04

## 2022-04-19 RX ADMIN — DOCUSATE SODIUM 100 MG: 100 CAPSULE, LIQUID FILLED ORAL at 09:18

## 2022-04-19 RX ADMIN — ACETAMINOPHEN 325MG 650 MG: 325 TABLET ORAL at 12:08

## 2022-04-19 RX ADMIN — BUPROPION HYDROCHLORIDE 150 MG: 150 TABLET, EXTENDED RELEASE ORAL at 09:17

## 2022-04-19 RX ADMIN — KETOROLAC TROMETHAMINE 30 MG: 30 INJECTION, SOLUTION INTRAMUSCULAR at 09:18

## 2022-04-19 RX ADMIN — IBUPROFEN 600 MG: 600 TABLET ORAL at 22:04

## 2022-04-19 RX ADMIN — ACETAMINOPHEN 325MG 650 MG: 325 TABLET ORAL at 06:13

## 2022-04-19 RX ADMIN — HUMAN RHO(D) IMMUNE GLOBULIN 300 MCG: 300 INJECTION, SOLUTION INTRAMUSCULAR at 10:30

## 2022-04-19 RX ADMIN — DIPHENHYDRAMINE HYDROCHLORIDE 25 MG: 50 INJECTION, SOLUTION INTRAMUSCULAR; INTRAVENOUS at 02:06

## 2022-04-19 RX ADMIN — DOCUSATE SODIUM 100 MG: 100 CAPSULE, LIQUID FILLED ORAL at 17:54

## 2022-04-19 RX ADMIN — KETOROLAC TROMETHAMINE 30 MG: 30 INJECTION, SOLUTION INTRAMUSCULAR at 03:50

## 2022-04-19 RX ADMIN — KETOROLAC TROMETHAMINE 30 MG: 30 INJECTION, SOLUTION INTRAMUSCULAR at 15:03

## 2022-04-19 RX ADMIN — ACETAMINOPHEN 325MG 650 MG: 325 TABLET ORAL at 17:54

## 2022-04-19 NOTE — LACTATION NOTE
This note was copied from a baby's chart  CONSULT - LACTATION  Baby Boy Theodoro Began) Mara Echols 1 days male MRN: [id number]    [facility] NURSERY Room / Bed: L&D 311(N)/L&D 311(N) Encounter: [id number]    Maternal Information     MOTHER:  Rachel Nice  Maternal Age: 27 y o    OB History: # 1 - Date: 22, Sex: Male, Weight: 2510 g (5 lb 8 5 oz), GA: 35w4d, Delivery: , Low Transverse, Apgar1: 9, Apgar5: 9, Living: Living, Birth Comments: None   Previouse breast reduction surgery? No    Lactation history:   Has patient previously breast fed: No   How long had patient previously breast fed:     Previous breast feeding complications:       Past Surgical History:   Procedure Laterality Date    EGD      KNEE SURGERY      DE  DELIVERY ONLY N/A 2022    Procedure:  SECTION (); Surgeon: Ariel Jordan DO;  [facility]: [facility];  Service: Obstetrics        Birth information:  YOB: 2023   Time of birth: 9:10 PM   Sex: male   Delivery type: , Low Transverse   Birth Weight: 2510 g (5 lb 8 5 oz)   Percent of Weight Change: 0%     Gestational Age: 29w2d   [unfilled]    Assessment     Breast and nipple assessment: normal assessment     Assessment: normal assessment    Feeding assessment: feeding well  LATCH:  Latch: Grasps breast, tongue down, lips flanged, rhythmic sucking   Audible Swallowing: Spontaneous and intermittent (24 hours old)   Type of Nipple: Everted (After stimulation)   Comfort (Breast/Nipple): Soft/non-tender   Hold (Positioning): Partial assist, teach one side, mother does other, staff holds   [facility] Score: 9          Feeding recommendations:  breast feed on demand     Reviewed RSB  D/C booklet at bedside  HE highly effective  Teaching abou LPI feeding habits  Guicho Best well  Information given and discussed on breastfeeding a late  infant    Discussed sleepiness, maintaining body temperature, the lack of stamina necessitating shorter feedings  Encouraged feeding every 2-3 hours around the clock followed by hand expressing/pumping  Discussed 2nd night syndrome and ways to calm infant  Hand out given  Information on hand expression given  Discussed benefits of knowing how to manually express breast including stimulating milk supply, softening nipple for latch and evacuating breast in the event of engorgement  Worked on positioning infant up at chest level and starting to feed infant with nose arriving at the nipple  Then, using areolar compression to achieve a deep latch that is comfortable and exchanges optimum amounts of milk  Met with mother  Provided mother with Ready, Set, Baby booklet  Discussed Skin to Skin contact an benefits to mom and baby  Talked about the delay of the first bath until baby has adjusted  Spoke about the benefits of rooming in  Feeding on cue and what that means for recognizing infant's hunger  Avoidance of pacifiers for the first month discussed  Talked about exclusive breastfeeding for the first 6 months  Positioning and latch reviewed as well as showing images of other feeding positions  Discussed the properties of a good latch in any position  Reviewed hand/manual expression  Discussed s/s that baby is getting enough milk and some s/s that breastfeeding dyad may need further help  Gave information on common concerns, what to expect the first few weeks after delivery, preparing for other caregivers, and how partners can help  Resources for support also provided  Encouraged parents to call for assistance, questions, and concerns about breastfeeding  Extension provided    Day Saldivar RN 4/19/2022 12:15 PM

## 2022-04-19 NOTE — DISCHARGE SUMMARY
CS Discharge Summary - Elba Nguyen 27 y o  female MRN: [id number]    Unit/Bed#: L&D 311-01 Encounter: [id number]    Admission Date: 2022     Discharge Date: 2022    Admitting Diagnosis:   35w4d pregnancy  Breech presentation   premature rupture of membranes   GBS unknown  Rh negative  Celiac disease    Discharge Diagnosis:   Same, delivered    Procedures:   primary  section, low transverse incision    Admitting Attending: Dr Heather Hayden  Delivery Attending: Dr Kecia Desai  Discharge Attending: Dr De La Fuente [facility] service: none  Consult attending: none    Hospital Course:     Elba Nguyen is a 27 y o  Oscar Ahumada who was admitted at 35w3d for  premature rupture of membranes with breech presentation  She declined ECV  FHT was cateogry I and SVE at time of admission was closed/thick/high  She was expectantly managed for steroid benefit per maternal-fetal-medicine recommendations  Following completion of betamethasone course, patient consented to primary low transverse  section  She then underwent an uncomplicated  section delivery and delivered a viable male  at 5  APGARS were 9, 9 at 1 and 5 minutes, respectively   weighed 5lb 4oz  Placenta was delivered at 0   was then transferred to NICU, then eventually discharged to  nursery  Patient tolerated the procedure well and was transferred to recovery in stable condition  The patient's post partum course was unremarkable      Her postoperative pain was well controlled with oral analgesics  On day of discharge, she was ambulating and able to reasonably perform all ADLs  She was voiding and had appropriate bowel function  Pain was well controlled  She was discharged home on post-operative day #4 without complications   Patient was instructed to follow up with her OB as an outpatient and was given appropriate warnings to call provider if she develops signs of infection or uncontrolled pain  She is breast feeding   Complications:   None    Condition at discharge:   good     Provisions for Follow-Up Care:  See after visit summary for information related to follow-up care and any pertinent home health orders  Disposition:   Home    Planned Readmission:   No    Discharge Medications:   Prenatal vitamin daily for 6 months or the duration of nursing whichever is longer  Motrin 600 mg orally every 6 hours as needed for pain  Tylenol (over the counter) per bottle directions as needed for pain  Hydrocortisone cream 1% (over the counter) applied 1-2x daily to hemorrhoids as needed  Witch hazel pads for hemorrhoidal discomfort as needed      Discharge instructions :   -Do not place anything (no partner, tampons or douche) in your vagina for 6 weeks  -You may walk for exercise for the first 6 weeks then gradually return to your usual activities    -Please do not drive for 1 week if you have no stitches and for 2 weeks if you have stitches or underwent a  delivery     -You may take baths or shower per your preference    -Please look at your bust (breasts) in the mirror daily and call provider for redness or tenderness or increased warmth  - If you have had a  please look at your incision daily as well and call provider for increasing redness or steady drainage from the incision    -Please call your provider if temperature > 100 4*F or 38* C, worsening pain or a foul discharge      Boby Collins DO  2022  [id number]

## 2022-04-19 NOTE — OP NOTE
OPERATIVE REPORT  PATIENT NAME: Caryl Fajardo    :  1990  MRN: [id number]  Pt Location: [facility] L&D OR ROOM 01    SURGERY DATE: 2022    Indications:   Breech presentation   premature rupture of membranes     Pre-operative Diagnosis:   35w4d pregnancy  Breech presentation   premature rupture of membranes   GBS unknown  Rh negative  Celiac disease    Post-operative Diagnosis:   1LTCS     Attending: Geraldine Weber DO  Resident: Prema Paris MD    Maternal Findings:  Normal uterus  Normal tubes and ovaries bilaterally  No adhesions  No difficulty noted from skin to delivery     Findings:  Viable male in yun breech presentation, weighing 5lbs 8 5oz;  Apgar scores of 9 at one minute and 9 at five minutes  Clear amniotic fluid  Normal placenta with 3-vessel cord    Arterial and Venous Gases:  Umbilical Cord Venous Blood Gas:  Results from last 7 days   Lab Units 22   PH COV  7 336   PCO2 COV mm HG 43 2*   HCO3 COV mmol/L 22 6   BASE EXC COV mmol/L -3 2*   O2 CT CD VB mL/dL 8 4   O2 HGB, VENOUS CORD % 11 2     Umbilical Cord Arterial Blood Gas:  Results from last 7 days   Lab Units 22   PH COA  7 252   PCO2 COA  50 6   PO2 COA mm HG 18 9   HCO3 COA mmol/L 21 8   BASE EXC COA mmol/L -5 8*   O2 CONTENT CORD ART ml/dl 7 9   O2 HGB, ARTERIAL CORD % 37 4       Specimens: Arterial and venous cord gases, cord blood, segment of umbilical cord, placenta to pathology    Quantitative Blood Loss: 428 mL    Fluids: 3 L LR    Drains: Palma catheter           Complications:  None apparent; patient tolerated the procedure well  Disposition: PACU            Condition: stable    Procedure Details   The patient was seen prior to the procedure  Risks, benefits, possible complications, alternate treatment options, and expected outcomes were discussed with the patient    The patient agreed with the proposed plan and gave informed consent for a primary low transverse  section  The patient was taken to the [facility] Operating Room where she received spinal anesthesia  For infection prophylaxis, she received 270 mg gentamicin, 500 mg azithromycin in addition to the clindamycin 900 mg q8h she had been receiving in the setting of GBS unknown status with allergy to penicillin  Fetal heart tones in the OR were assessed and noted to be within normal limits and a Palma catheter and SCDs were placed  The abdomen was prepped with Chloraprep, the vagina was prepped with Betadine, and following appropriate drying time, the patient was draped in the usual sterile manner  A Time Out was held and the above information confirmed  The patient was identified as Carmen No and the procedure verified as a  Delivery for breech presentation in the setting of  premature rupture of membranes  A Pfannenstiel incision was made and carried down through the underlying subcutaneous tissue to the fascia using a scalpel  The rectus fascia was then nicked in the midline and dissected laterally using Nolen scissors  Subcutaneous bleeding was cauterized using the Bovie  The superior edge of the  fascial incision was grasped with Kocher clamps bilaterally, tented upward and the underlying rectus muscles were dissected off sharply with Nolen scissors  This was repeated on the inferior edge of the fascia and dissected down to the pubic rami  The rectus muscles were  and the peritoneum was identified, entered, and extended longitudinally with blunt dissection  A low transverse uterine incision was made with the scalpel and extended cephalocaudad with blunt dissection  The amnion was entered bluntly  Surgeons hand was inserted through the hysterotomy and the fetal buttocks was palpated and elevated to the hysterotomy  The fetal hips were gently grasped and the the fetus was delivered  The bilateral legs were gently delivered using Pinard's maneuver   The fetus was delivered to the level of the scapula, gently rotated to allow for gentle sweeping and delivery of the upper extremities  The head delivered spontaneously with gentle pressure on the bilateral maxillary processes to flex the head  Time of birth was noted at 5  There was noted to be spontaneous cry and good tone  There was no apparent injury to the   The umbilical cord was doubly clamped and cut after 30 seconds to allow for delayed cord clamping  The infant was handed off to the  providers  Arterial and venous cord gases, cord blood, and a segment of umbilical cord were obtained for evaluation  The placenta delivered spontaneously at 1913 with uterine fundal massage and appeared normal  The uterus was cleaned out with a moist lap sponge  The Jimbo retractor was inserted    The uterine incision was closed with a running locked suture of 0 Vicryl  A second layer of the same suture was used to vertically imbricate the first  Bleeding at the right angle was suture ligated with a figure of eight stitch of 0-Vicryl  Hemostasis was noted to be excellent  Normal saline solution was used to irrigate the posterior culdesac  The paracolic gutters were inspected and cleared of all clots and debris  Hysterotomy was inspected and remained hemostatic  The Jimbo retractor was removed  The peritoneum was closed with a running non-locked suture of 2-0 plain gut The fascia was closed with a running suture of 0 Vicryl  Subcutaneous adipose tissue was closed with a running suture of 2-0 Plain gut  The skin was closed with a subcuticular running suture of 4-0 Monocryl  Exofin was applied  The patient appeared to tolerate the procedure very well  Lap sponge, needle, and instrument counts were correct x2  The patient's fundus was palpated and the uterus was expressed   She was then cleaned and transferred to her postpartum recovery room in stable condition and her infant went to the  nursery  Attending Attestation: Dr Maisha Whitten DO was present for the entire procedure      SIGNATURE: Abigail Michaels MD  DATE: April 18, 2022  TIME: 8:07 PM

## 2022-04-19 NOTE — ANESTHESIA POSTPROCEDURE EVALUATION
Post-Op Assessment Note    CV Status:  Stable    Pain management: adequate     Mental Status:  Alert and awake   Hydration Status:  Euvolemic   PONV Controlled:  Controlled   Airway Patency:  Patent      Post Op Vitals Reviewed: Yes      Staff: Anesthesiologist, CRNA         No complications documented      BP      Temp      Pulse     Resp      SpO2      /62 (BP Location: Right arm)   Pulse 61   Temp 98 4 °F (36 9 °C) (Oral)   Resp 18   Ht 5' 4" (1 626 m)   Wt 71 2 kg (157 lb)   LMP 08/12/2021 (Exact Date)   SpO2 96%   Breastfeeding Unknown   BMI 26 95 kg/m²

## 2022-04-19 NOTE — PLAN OF CARE
Problem: PAIN - ADULT  Goal: Verbalizes/displays adequate comfort level or baseline comfort level  Description: Interventions:  - Encourage patient to monitor pain and request assistance  - Assess pain using appropriate pain scale  - Administer analgesics based on type and severity of pain and evaluate response  - Implement non-pharmacological measures as appropriate and evaluate response  - Consider cultural and social influences on pain and pain management  - Notify physician/advanced practitioner if interventions unsuccessful or patient reports new pain  Outcome: Progressing     Problem: INFECTION - ADULT  Goal: Absence or prevention of progression during hospitalization  Description: INTERVENTIONS:  - Assess and monitor for signs and symptoms of infection  - Monitor lab/diagnostic results  - Monitor all insertion sites, i e  indwelling lines, tubes, and drains  - Monitor endotracheal if appropriate and nasal secretions for changes in amount and color  - [place] appropriate cooling/warming therapies per order  - Administer medications as ordered  - Instruct and encourage patient and family to use good hand hygiene technique  - Identify and instruct in appropriate isolation precautions for identified infection/condition  Outcome: Progressing  Goal: Absence of fever/infection during neutropenic period  Description: INTERVENTIONS:  - Monitor WBC    Outcome: Progressing     Problem: SAFETY ADULT  Goal: Patient will remain free of falls  Description: INTERVENTIONS:  - Educate patient/family on patient safety including physical limitations  - Instruct patient to call for assistance with activity   - Consult OT/PT to assist with strengthening/mobility   - Keep Call bell within reach  - Keep bed low and locked with side rails adjusted as appropriate  - Keep care items and personal belongings within reach  - Initiate and maintain comfort rounds  - Make Fall Risk Sign visible to staff  - Apply yellow socks and bracelet for high fall risk patients  - Consider moving patient to room near nurses station  Outcome: Progressing  Goal: Maintain or return to baseline ADL function  Description: INTERVENTIONS:  -  Assess patient's ability to carry out ADLs; assess patient's baseline for ADL function and identify physical deficits which impact ability to perform ADLs (bathing, care of mouth/teeth, toileting, grooming, dressing, etc )  - Assess/evaluate cause of self-care deficits   - Assess range of motion  - Assess patient's mobility; develop plan if impaired  - Assess patient's need for assistive devices and provide as appropriate  - Encourage maximum independence but intervene and supervise when necessary  - Involve family in performance of ADLs  - Assess for home care needs following discharge   - Consider OT consult to assist with ADL evaluation and planning for discharge  - Provide patient education as appropriate  Outcome: Progressing  Goal: Maintains/Returns to pre admission functional level  Description: INTERVENTIONS:  - Perform BMAT or MOVE assessment daily    - Set and communicate daily mobility goal to care team and patient/family/caregiver  - Collaborate with rehabilitation services on mobility goals if consulted  - Out of bed for toileting  - Record patient progress and toleration of activity level   Outcome: Progressing     Problem: Knowledge Deficit  Goal: Patient/family/caregiver demonstrates understanding of disease process, treatment plan, medications, and discharge instructions  Description: Complete learning assessment and assess knowledge base    Interventions:  - Provide teaching at level of understanding  - Provide teaching via preferred learning methods  Outcome: Progressing     Problem: DISCHARGE PLANNING  Goal: Discharge to home or other facility with appropriate resources  Description: INTERVENTIONS:  - Identify barriers to discharge w/patient and caregiver  - Arrange for needed discharge resources and transportation as appropriate  - Identify discharge learning needs (meds, wound care, etc )  - Arrange for interpretive services to assist at discharge as needed  - Refer to Case Management Department for coordinating discharge planning if the patient needs post-hospital services based on physician/advanced practitioner order or complex needs related to functional status, cognitive ability, or social support system  Outcome: Progressing     Problem: BIRTH - VAGINAL/ SECTION  Goal: Fetal and maternal status remain reassuring during the birth process  Description: INTERVENTIONS:  - Monitor vital signs  - Monitor fetal heart rate  - Monitor uterine activity  - Monitor labor progression (vaginal delivery)  - DVT prophylaxis  - Antibiotic prophylaxis  Outcome: Progressing  Goal: Emotionally satisfying birthing experience for mother/fetus  Description: Interventions:  - Assess, plan, implement and evaluate the nursing care given to the patient in labor  - Advocate the philosophy that each childbirth experience is a unique experience and support the family's chosen level of involvement and control during the labor process   - Actively participate in both the patient's and family's teaching of the birth process  - Consider cultural, [employer] and age-specific factors and plan care for the patient in labor  Outcome: Progressing

## 2022-04-19 NOTE — PROGRESS NOTES
Postpartum Note    Patient is post-op day 1 from a , due to breech s/p PPROM  Pain: yes  Tolerating Oral Intake: yes  Voiding: catheter in place  Flatus: yes  Bowel Movement: no  Ambulating: yes  Breastfeeding: Breastfeeding  Chest Pain: no  Shortness of Breath: no  Leg Pain/Discomfort: no  Lochia: minimal    Objective:   Vitals:    22 0330   BP: 100/56   Pulse: 55   Resp: 16   Temp: 97 9 °F (36 6 °C)   SpO2: 96%       Intake/Output Summary (Last 24 hours) at 2022 9872  Last data filed at 2022 2046  Gross per 24 hour   Intake 3000 ml   Output 628 ml   Net 2372 ml       Physical Exam:  General: in no apparent distress, oriented times 3, normal vitals and cooperative  Abdomen: abdomen is soft without significant tenderness, masses, organomegaly or guarding  Fundus: Firm, 2 cm below the umbilicus  Incision: clean, dry, and intact  Lower extremities: nontender    Labs/Tests:   I have reviewed all lab results which are normal or stable  Assessment and Plan:  27y o  year-old , postoperative day 1 status-post Primary Low Transverse  delivery    Continue routine postpartum care  Encourage ambulation  Advance diet as tolerated  D/C bossman Lomeli DO  22  6:09 AM

## 2022-04-19 NOTE — DISCHARGE INSTRUCTIONS
Section   WHAT YOU SHOULD KNOW:   A  delivery, or , is abdominal surgery to deliver your baby  There are many reasons you may need a   · A  may be scheduled before labor if you had a  with your last baby  It may be scheduled if your baby is not positioned normally, or you are pregnant with more than 1 baby  · Your caregiver may perform an emergency  during labor to prevent life-threatening complications for you or your baby  A  may be done if your cervix does not dilate after several hours of active labor  · Other reasons for a  include maternal infections and problems with the placenta  AFTER YOU LEAVE:   Medicines:   · Prescription pain medicine  may be given  Ask how to take this medicine safely  · Acetaminophen  decreases pain and fever  It is available without a doctor's order  Ask how much to take and how often to take it  Follow directions  Acetaminophen can cause liver damage if not taken correctly  · NSAIDs  help decrease swelling and pain or fever  This medicine is available with or without a doctor's order  NSAIDs can cause stomach bleeding or kidney problems in certain people  If you take blood thinner medicine, always ask your obstetrician if NSAIDs are safe for you  Always read the medicine label and follow directions  · Take your medicine as directed  Contact your obstetrician (OB) if you think your medicine is not helping or if you have side effects  Tell him if you are allergic to any medicine  Keep a list of the medicines, vitamins, and herbs you take  Include the amounts, and when and why you take them  Bring the list or the pill bottles to follow-up visits  Carry your medicine list with you in case of an emergency  Follow up with your OB as directed: You may need to return to have your stitches or staples removed  Write down your questions so you remember to ask them during your visits    Wound care:  Carefully wash your wound with soap and water every day  Keep your wound clean and dry  Wear loose, comfortable clothes that do not rub against your wound  Ask your OB about bathing and showering  Drink plenty of liquids: You can lower your risk for a blood clot if you drink plenty of liquids  Ask how much liquid to drink each day and which liquids are best for you  Limit activity until you have fully recovered from surgery:   · Ask when it is safe for you to drive, walk up stairs, lift heavy objects, and have sex  · Ask when it is okay to exercise, and what types of exercise to do  Start slowly and do more as you get stronger  Contact your OB if:   · You have heavy vaginal bleeding that fills 1 or more sanitary pads in 1 hour  · You have a fever  · Your incision is swollen, red, or draining pus  · You have questions or concerns about yourself or your baby  Seek care immediately or call 911 if:   · Blood soaks through your bandage  · Your stitches come apart  · You feel lightheaded, short of breath, and have chest pain  · You cough up blood  · Your arm or leg feels warm, tender, and painful  It may look swollen and red  © 2014 [address] is for End User's use only and may not be sold, redistributed or otherwise used for commercial purposes  All illustrations and images included in CareNotes® are the copyrighted property of A D A M [employer] Inc  or [employer]  The above information is an  only  It is not intended as medical advice for individual conditions or treatments  Talk to your doctor, nurse or pharmacist before following any medical regimen to see if it is safe and effective for you

## 2022-04-20 LAB — GP B STREP DNA SPEC QL NAA+PROBE: NEGATIVE

## 2022-04-20 PROCEDURE — NC001 PR NO CHARGE: Performed by: OBSTETRICS & GYNECOLOGY

## 2022-04-20 RX ADMIN — IBUPROFEN 600 MG: 600 TABLET ORAL at 07:20

## 2022-04-20 RX ADMIN — ACETAMINOPHEN 325MG 650 MG: 325 TABLET ORAL at 04:05

## 2022-04-20 RX ADMIN — OXYCODONE HYDROCHLORIDE 5 MG: 5 TABLET ORAL at 12:44

## 2022-04-20 RX ADMIN — DOCUSATE SODIUM 100 MG: 100 CAPSULE, LIQUID FILLED ORAL at 17:33

## 2022-04-20 RX ADMIN — ACETAMINOPHEN 325MG 650 MG: 325 TABLET ORAL at 22:52

## 2022-04-20 RX ADMIN — DOCUSATE SODIUM 100 MG: 100 CAPSULE, LIQUID FILLED ORAL at 09:45

## 2022-04-20 RX ADMIN — IBUPROFEN 600 MG: 600 TABLET ORAL at 17:33

## 2022-04-20 RX ADMIN — ACETAMINOPHEN 325MG 650 MG: 325 TABLET ORAL at 09:45

## 2022-04-20 RX ADMIN — OXYCODONE HYDROCHLORIDE 10 MG: 5 TABLET ORAL at 00:28

## 2022-04-20 RX ADMIN — ACETAMINOPHEN 325MG 650 MG: 325 TABLET ORAL at 16:27

## 2022-04-20 NOTE — LACTATION NOTE
Attempted to see patient for follow up lactation support  Reggie Givens declined at the time for telephone call

## 2022-04-20 NOTE — PLAN OF CARE
Problem: PAIN - ADULT  Goal: Verbalizes/displays adequate comfort level or baseline comfort level  Description: Interventions:  - Encourage patient to monitor pain and request assistance  - Assess pain using appropriate pain scale  - Administer analgesics based on type and severity of pain and evaluate response  - Implement non-pharmacological measures as appropriate and evaluate response  - Consider cultural and social influences on pain and pain management  - Notify physician/advanced practitioner if interventions unsuccessful or patient reports new pain  Outcome: Progressing     Problem: INFECTION - ADULT  Goal: Absence or prevention of progression during hospitalization  Description: INTERVENTIONS:  - Assess and monitor for signs and symptoms of infection  - Monitor lab/diagnostic results  - Monitor all insertion sites, i e  indwelling lines, tubes, and drains  - Monitor endotracheal if appropriate and nasal secretions for changes in amount and color  - [place] appropriate cooling/warming therapies per order  - Administer medications as ordered  - Instruct and encourage patient and family to use good hand hygiene technique  - Identify and instruct in appropriate isolation precautions for identified infection/condition  Outcome: Progressing  Goal: Absence of fever/infection during neutropenic period  Description: INTERVENTIONS:  - Monitor WBC    Outcome: Progressing     Problem: SAFETY ADULT  Goal: Patient will remain free of falls  Description: INTERVENTIONS:  - Educate patient/family on patient safety including physical limitations  - Instruct patient to call for assistance with activity   - Consult OT/PT to assist with strengthening/mobility   - Keep Call bell within reach  - Keep bed low and locked with side rails adjusted as appropriate  - Keep care items and personal belongings within reach  - Initiate and maintain comfort rounds  - Make Fall Risk Sign visible to staff  - Apply yellow socks and bracelet for high fall risk patients  - Consider moving patient to room near nurses station  Outcome: Progressing  Goal: Maintain or return to baseline ADL function  Description: INTERVENTIONS:  -  Assess patient's ability to carry out ADLs; assess patient's baseline for ADL function and identify physical deficits which impact ability to perform ADLs (bathing, care of mouth/teeth, toileting, grooming, dressing, etc )  - Assess/evaluate cause of self-care deficits   - Assess range of motion  - Assess patient's mobility; develop plan if impaired  - Assess patient's need for assistive devices and provide as appropriate  - Encourage maximum independence but intervene and supervise when necessary  - Involve family in performance of ADLs  - Assess for home care needs following discharge   - Consider OT consult to assist with ADL evaluation and planning for discharge  - Provide patient education as appropriate  Outcome: Progressing  Goal: Maintains/Returns to pre admission functional level  Description: INTERVENTIONS:  - Perform BMAT or MOVE assessment daily    - Set and communicate daily mobility goal to care team and patient/family/caregiver  - Collaborate with rehabilitation services on mobility goals if consulted  - Out of bed for toileting  - Record patient progress and toleration of activity level   Outcome: Progressing     Problem: Knowledge Deficit  Goal: Patient/family/caregiver demonstrates understanding of disease process, treatment plan, medications, and discharge instructions  Description: Complete learning assessment and assess knowledge base    Interventions:  - Provide teaching at level of understanding  - Provide teaching via preferred learning methods  Outcome: Progressing     Problem: DISCHARGE PLANNING  Goal: Discharge to home or other facility with appropriate resources  Description: INTERVENTIONS:  - Identify barriers to discharge w/patient and caregiver  - Arrange for needed discharge resources and transportation as appropriate  - Identify discharge learning needs (meds, wound care, etc )  - Arrange for interpretive services to assist at discharge as needed  - Refer to Case Management Department for coordinating discharge planning if the patient needs post-hospital services based on physician/advanced practitioner order or complex needs related to functional status, cognitive ability, or social support system  Outcome: Progressing     Problem: BIRTH - VAGINAL/ SECTION  Goal: Fetal and maternal status remain reassuring during the birth process  Description: INTERVENTIONS:  - Monitor vital signs  - Monitor fetal heart rate  - Monitor uterine activity  - Monitor labor progression (vaginal delivery)  - DVT prophylaxis  - Antibiotic prophylaxis  Outcome: Progressing  Goal: Emotionally satisfying birthing experience for mother/fetus  Description: Interventions:  - Assess, plan, implement and evaluate the nursing care given to the patient in labor  - Advocate the philosophy that each childbirth experience is a unique experience and support the family's chosen level of involvement and control during the labor process   - Actively participate in both the patient's and family's teaching of the birth process  - Consider cultural, Catholic and age-specific factors and plan care for the patient in labor  Outcome: Progressing

## 2022-04-20 NOTE — PROGRESS NOTES
Progress Note - OB/GYN   Kaylee Kincaid 27 y o  female MRN: [id number]  Unit/Bed#: L&D 311-01 Encounter: [id number]    Assessment:  POD#2 s/p primary low transverse  section, for breech and PPROM stable, baby now in NICU for feeding    Plan:  1) Post-op care  - Encourage ambulation  - Encourage breastfeeding  - Continue current meds     2) [place] catheter  -patient voiding spontaneously    3) Contraception   - declines     4) Pain control   - Pain is controlled with current analgesics  5) Postoperative Anemia   - QBL 428mL  - Hgb 12 2 -> 9 6     6) Anxiety  - wellbutrin ordered    7) Rh negative  - Rhogam ordered    8) Disposition: home POD# 3 vs 4, baby in NICU    Subjective/Objective     Subjective:     Pain: yes, improved with meds  Tolerating PO: yes  Voiding: yes  Flatus: yes  BM: no  Ambulating: yes  Breastfeeding: Breastfeeding  Chest pain: no  Shortness of breath: no  Leg pain: no  Lochia: equivalent to menses     Objective:     Vitals:  Vitals:    22 1145 22 1503 22 2300 22 0300   BP: 108/56 110/63 142/70 111/62   BP Location: Left arm Right arm Right arm Right arm   Pulse: (!) 54 56 69 58   Resp: 16 14 18 18   Temp: 98 °F (36 7 °C) 98 °F (36 7 °C) (!) 97 2 °F (36 2 °C) (!) 97 1 °F (36 2 °C)   TempSrc: Oral  Temporal Temporal   SpO2: 96% 95%     Weight:       Height:           Physical Exam:   GEN: The patient was alert and oriented x3, pleasant well-appearing female in no acute distress     CV: Regular rate  PULM: nonlabored respirations  MSK: Normal gait  Skin: warm, dry  Neuro: no focal deficits  Psych: normal affect and judgement, cooperative  Abd: soft, moderate tenderness throughout abdomen,   Uterine fundus firm and non-tender, at the umbilicus, Incision C/D/I        Lab Results   Component Value Date    WBC 17 11 (H) 2022    HGB 9 6 (L) 2022    HCT 28 0 (L) 2022     (H) 2022     2022           Daniel Dove MD, PGY-1  Obstetrics & Gynecology  04/20/22  6:20 AM

## 2022-04-21 PROCEDURE — NC001 PR NO CHARGE: Performed by: OBSTETRICS & GYNECOLOGY

## 2022-04-21 RX ORDER — DIPHENHYDRAMINE HCL 25 MG
25 TABLET ORAL EVERY 6 HOURS PRN
Status: DISCONTINUED | OUTPATIENT
Start: 2022-04-21 | End: 2022-04-22 | Stop reason: HOSPADM

## 2022-04-21 RX ADMIN — OXYCODONE HYDROCHLORIDE 5 MG: 5 TABLET ORAL at 23:20

## 2022-04-21 RX ADMIN — IBUPROFEN 600 MG: 600 TABLET ORAL at 07:22

## 2022-04-21 RX ADMIN — SENNOSIDES 8.8 MG: 8.8 SYRUP ORAL at 00:42

## 2022-04-21 RX ADMIN — OXYCODONE HYDROCHLORIDE 5 MG: 5 TABLET ORAL at 11:29

## 2022-04-21 RX ADMIN — DOCUSATE SODIUM 100 MG: 100 CAPSULE, LIQUID FILLED ORAL at 17:44

## 2022-04-21 RX ADMIN — OXYCODONE HYDROCHLORIDE 10 MG: 5 TABLET ORAL at 17:44

## 2022-04-21 RX ADMIN — IBUPROFEN 600 MG: 600 TABLET ORAL at 20:31

## 2022-04-21 RX ADMIN — ACETAMINOPHEN 325MG 650 MG: 325 TABLET ORAL at 07:19

## 2022-04-21 RX ADMIN — DOCUSATE SODIUM 100 MG: 100 CAPSULE, LIQUID FILLED ORAL at 08:30

## 2022-04-21 RX ADMIN — OXYCODONE HYDROCHLORIDE 10 MG: 5 TABLET ORAL at 00:41

## 2022-04-21 RX ADMIN — SENNOSIDES 8.8 MG: 8.8 SYRUP ORAL at 23:20

## 2022-04-21 NOTE — PROGRESS NOTES
Progress Note - OB/GYN   Lisa Feng 27 y o  female MRN: [id number]  Unit/Bed#: L&D 311-01 Encounter: [id number]    Assessment:  POD#3 s/p primary low transverse  section, for breech and PPROM stable, baby now in NICU for feeding, possible dc today    Plan:  1) Post-op care  - Encourage ambulation  - Encourage breastfeeding  - Continue current meds     2) [place] catheter  -patient voiding spontaneously    3) Contraception   - declines     4) Pain control   - Pain is controlled with current analgesics  5) Postoperative Anemia   - QBL 428mL  - Hgb 12 2 -> 9 6     6) Anxiety  - wellbutrin discontinued, not taken during pregnancy    7) Rh negative  - Rhogam ordered    8) Disposition: home POD# 3 vs 4, baby in NICU    Subjective/Objective     Subjective:     Pain: yes, improved with meds  Tolerating PO: yes  Voiding: yes  Flatus: yes  BM: no  Ambulating: yes  Breastfeeding: Breastfeeding  Chest pain: no  Shortness of breath: no  Leg pain: no  Lochia: equivalent to menses     Objective:     Vitals:  Vitals:    22 0300 22 0720 22 1100 22 2300   BP: 111/62 109/60 106/56 114/59   BP Location: Right arm Right arm  Right arm   Pulse: 58 55 60 72   Resp: 18 16 16 18   Temp: (!) 97 1 °F (36 2 °C) 98 5 °F (36 9 °C)  97 7 °F (36 5 °C)   TempSrc: Temporal Oral  Temporal   SpO2:  96% 98%    Weight:       Height:           Physical Exam:   GEN: The patient was alert and oriented x3, pleasant well-appearing female in no acute distress     CV: Regular rate  PULM: nonlabored respirations  MSK: Normal gait  Skin: warm, dry  Neuro: no focal deficits  Psych: normal affect and judgement, cooperative  Abd: soft, moderate tenderness throughout abdomen,   Uterine fundus firm and non-tender, at the umbilicus, Incision C/D/I        Lab Results   Component Value Date    WBC 17 11 (H) 2022    HGB 9 6 (L) 2022    HCT 28 0 (L) 2022     (H) 2022     2022 Michelle Sheikh MD, PGY-1  Obstetrics & Gynecology  04/21/22  6:52 AM

## 2022-04-21 NOTE — PLAN OF CARE
Problem: PAIN - ADULT  Goal: Verbalizes/displays adequate comfort level or baseline comfort level  Description: Interventions:  - Encourage patient to monitor pain and request assistance  - Assess pain using appropriate pain scale  - Administer analgesics based on type and severity of pain and evaluate response  - Implement non-pharmacological measures as appropriate and evaluate response  - Consider cultural and social influences on pain and pain management  - Notify physician/advanced practitioner if interventions unsuccessful or patient reports new pain  Outcome: Progressing     Problem: INFECTION - ADULT  Goal: Absence or prevention of progression during hospitalization  Description: INTERVENTIONS:  - Assess and monitor for signs and symptoms of infection  - Monitor lab/diagnostic results  - Monitor all insertion sites, i e  indwelling lines, tubes, and drains  - Monitor endotracheal if appropriate and nasal secretions for changes in amount and color  - [place] appropriate cooling/warming therapies per order  - Administer medications as ordered  - Instruct and encourage patient and family to use good hand hygiene technique  - Identify and instruct in appropriate isolation precautions for identified infection/condition  Outcome: Progressing  Goal: Absence of fever/infection during neutropenic period  Description: INTERVENTIONS:  - Monitor WBC    Outcome: Progressing     Problem: SAFETY ADULT  Goal: Patient will remain free of falls  Description: INTERVENTIONS:  - Educate patient/family on patient safety including physical limitations  - Instruct patient to call for assistance with activity   - Consult OT/PT to assist with strengthening/mobility   - Keep Call bell within reach  - Keep bed low and locked with side rails adjusted as appropriate  - Keep care items and personal belongings within reach  - Initiate and maintain comfort rounds  - Make Fall Risk Sign visible to staff  - Apply yellow socks and bracelet for high fall risk patients  - Consider moving patient to room near nurses station  Outcome: Progressing  Goal: Maintain or return to baseline ADL function  Description: INTERVENTIONS:  -  Assess patient's ability to carry out ADLs; assess patient's baseline for ADL function and identify physical deficits which impact ability to perform ADLs (bathing, care of mouth/teeth, toileting, grooming, dressing, etc )  - Assess/evaluate cause of self-care deficits   - Assess range of motion  - Assess patient's mobility; develop plan if impaired  - Assess patient's need for assistive devices and provide as appropriate  - Encourage maximum independence but intervene and supervise when necessary  - Involve family in performance of ADLs  - Assess for home care needs following discharge   - Consider OT consult to assist with ADL evaluation and planning for discharge  - Provide patient education as appropriate  Outcome: Progressing  Goal: Maintains/Returns to pre admission functional level  Description: INTERVENTIONS:  - Perform BMAT or MOVE assessment daily    - Set and communicate daily mobility goal to care team and patient/family/caregiver  - Collaborate with rehabilitation services on mobility goals if consulted  - Out of bed for toileting  - Record patient progress and toleration of activity level   Outcome: Progressing     Problem: Knowledge Deficit  Goal: Patient/family/caregiver demonstrates understanding of disease process, treatment plan, medications, and discharge instructions  Description: Complete learning assessment and assess knowledge base    Interventions:  - Provide teaching at level of understanding  - Provide teaching via preferred learning methods  Outcome: Progressing     Problem: DISCHARGE PLANNING  Goal: Discharge to home or other facility with appropriate resources  Description: INTERVENTIONS:  - Identify barriers to discharge w/patient and caregiver  - Arrange for needed discharge resources and transportation as appropriate  - Identify discharge learning needs (meds, wound care, etc )  - Arrange for interpretive services to assist at discharge as needed  - Refer to Case Management Department for coordinating discharge planning if the patient needs post-hospital services based on physician/advanced practitioner order or complex needs related to functional status, cognitive ability, or social support system  Outcome: Progressing     Problem: BIRTH - VAGINAL/ SECTION  Goal: Fetal and maternal status remain reassuring during the birth process  Description: INTERVENTIONS:  - Monitor vital signs  - Monitor fetal heart rate  - Monitor uterine activity  - Monitor labor progression (vaginal delivery)  - DVT prophylaxis  - Antibiotic prophylaxis  Outcome: Progressing  Goal: Emotionally satisfying birthing experience for mother/fetus  Description: Interventions:  - Assess, plan, implement and evaluate the nursing care given to the patient in labor  - Advocate the philosophy that each childbirth experience is a unique experience and support the family's chosen level of involvement and control during the labor process   - Actively participate in both the patient's and family's teaching of the birth process  - Consider cultural, Zoroastrianism and age-specific factors and plan care for the patient in labor  Outcome: Progressing

## 2022-04-21 NOTE — LACTATION NOTE
This note was copied from a baby's chart  Baby came out of NICU today  Mom states she pumps about an ounce betwwen feeds  States baby latches well then takes breastmilk via bottle after  Denies SNS, states baby did not get enough yesterday  Informed them thaat she has more milk and baby is suckling better today, States their feeding plan is working for them  Denies need for assistance or supplies at this time  Dad is supportive at bedside  Encouraged parents to call for assistance, questions, and concerns about breastfeeding  Extension provided

## 2022-04-21 NOTE — PLAN OF CARE
Problem: PAIN - ADULT  Goal: Verbalizes/displays adequate comfort level or baseline comfort level  Description: Interventions:  - Encourage patient to monitor pain and request assistance  - Assess pain using appropriate pain scale  - Administer analgesics based on type and severity of pain and evaluate response  - Implement non-pharmacological measures as appropriate and evaluate response  - Consider cultural and social influences on pain and pain management  - Notify physician/advanced practitioner if interventions unsuccessful or patient reports new pain  Outcome: Progressing     Problem: INFECTION - ADULT  Goal: Absence or prevention of progression during hospitalization  Description: INTERVENTIONS:  - Assess and monitor for signs and symptoms of infection  - Monitor lab/diagnostic results  - Monitor all insertion sites, i e  indwelling lines, tubes, and drains  - Monitor endotracheal if appropriate and nasal secretions for changes in amount and color  - [place] appropriate cooling/warming therapies per order  - Administer medications as ordered  - Instruct and encourage patient and family to use good hand hygiene technique  - Identify and instruct in appropriate isolation precautions for identified infection/condition  Outcome: Progressing  Goal: Absence of fever/infection during neutropenic period  Description: INTERVENTIONS:  - Monitor WBC    Outcome: Progressing     Problem: SAFETY ADULT  Goal: Patient will remain free of falls  Description: INTERVENTIONS:  - Educate patient/family on patient safety including physical limitations  - Instruct patient to call for assistance with activity   - Consult OT/PT to assist with strengthening/mobility   - Keep Call bell within reach  - Keep bed low and locked with side rails adjusted as appropriate  - Keep care items and personal belongings within reach  - Initiate and maintain comfort rounds  - Make Fall Risk Sign visible to staff  - Apply yellow socks and bracelet for high fall risk patients  - Consider moving patient to room near nurses station  Outcome: Progressing  Goal: Maintain or return to baseline ADL function  Description: INTERVENTIONS:  -  Assess patient's ability to carry out ADLs; assess patient's baseline for ADL function and identify physical deficits which impact ability to perform ADLs (bathing, care of mouth/teeth, toileting, grooming, dressing, etc )  - Assess/evaluate cause of self-care deficits   - Assess range of motion  - Assess patient's mobility; develop plan if impaired  - Assess patient's need for assistive devices and provide as appropriate  - Encourage maximum independence but intervene and supervise when necessary  - Involve family in performance of ADLs  - Assess for home care needs following discharge   - Consider OT consult to assist with ADL evaluation and planning for discharge  - Provide patient education as appropriate  Outcome: Progressing  Goal: Maintains/Returns to pre admission functional level  Description: INTERVENTIONS:  - Perform BMAT or MOVE assessment daily    - Set and communicate daily mobility goal to care team and patient/family/caregiver  - Collaborate with rehabilitation services on mobility goals if consulted  - Out of bed for toileting  - Record patient progress and toleration of activity level   Outcome: Progressing     Problem: Knowledge Deficit  Goal: Patient/family/caregiver demonstrates understanding of disease process, treatment plan, medications, and discharge instructions  Description: Complete learning assessment and assess knowledge base    Interventions:  - Provide teaching at level of understanding  - Provide teaching via preferred learning methods  Outcome: Progressing     Problem: DISCHARGE PLANNING  Goal: Discharge to home or other facility with appropriate resources  Description: INTERVENTIONS:  - Identify barriers to discharge w/patient and caregiver  - Arrange for needed discharge resources and transportation as appropriate  - Identify discharge learning needs (meds, wound care, etc )  - Arrange for interpretive services to assist at discharge as needed  - Refer to Case Management Department for coordinating discharge planning if the patient needs post-hospital services based on physician/advanced practitioner order or complex needs related to functional status, cognitive ability, or social support system  Outcome: Progressing     Problem: BIRTH - VAGINAL/ SECTION  Goal: Fetal and maternal status remain reassuring during the birth process  Description: INTERVENTIONS:  - Monitor vital signs  - Monitor fetal heart rate  - Monitor uterine activity  - Monitor labor progression (vaginal delivery)  - DVT prophylaxis  - Antibiotic prophylaxis  Outcome: Progressing  Goal: Emotionally satisfying birthing experience for mother/fetus  Description: Interventions:  - Assess, plan, implement and evaluate the nursing care given to the patient in labor  - Advocate the philosophy that each childbirth experience is a unique experience and support the family's chosen level of involvement and control during the labor process   - Actively participate in both the patient's and family's teaching of the birth process  - Consider cultural, Baptism and age-specific factors and plan care for the patient in labor  Outcome: Progressing

## 2022-04-22 VITALS
HEIGHT: 64 IN | WEIGHT: 157 LBS | OXYGEN SATURATION: 96 % | SYSTOLIC BLOOD PRESSURE: 117 MMHG | BODY MASS INDEX: 26.8 KG/M2 | HEART RATE: 67 BPM | DIASTOLIC BLOOD PRESSURE: 68 MMHG | TEMPERATURE: 98.4 F | RESPIRATION RATE: 16 BRPM

## 2022-04-22 PROCEDURE — NC001 PR NO CHARGE: Performed by: OBSTETRICS & GYNECOLOGY

## 2022-04-22 RX ORDER — OXYCODONE HYDROCHLORIDE 5 MG/1
5 TABLET ORAL EVERY 4 HOURS PRN
Qty: 6 TABLET | Refills: 0 | Status: SHIPPED | OUTPATIENT
Start: 2022-04-22 | End: 2022-05-02

## 2022-04-22 RX ORDER — DOCUSATE SODIUM 100 MG/1
100 CAPSULE, LIQUID FILLED ORAL 2 TIMES DAILY
Qty: 60 CAPSULE | Refills: 0 | Status: SHIPPED | OUTPATIENT
Start: 2022-04-22

## 2022-04-22 RX ORDER — ACETAMINOPHEN 325 MG/1
650 TABLET ORAL EVERY 6 HOURS SCHEDULED
Qty: 6 TABLET | Refills: 0 | Status: SHIPPED | OUTPATIENT
Start: 2022-04-22 | End: 2022-04-23

## 2022-04-22 RX ORDER — IBUPROFEN 600 MG/1
600 TABLET ORAL EVERY 6 HOURS PRN
Qty: 30 TABLET | Refills: 0 | Status: SHIPPED | OUTPATIENT
Start: 2022-04-22

## 2022-04-22 RX ADMIN — DOCUSATE SODIUM 100 MG: 100 CAPSULE, LIQUID FILLED ORAL at 08:02

## 2022-04-22 RX ADMIN — IBUPROFEN 600 MG: 600 TABLET ORAL at 10:20

## 2022-04-22 NOTE — PLAN OF CARE
Problem: PAIN - ADULT  Goal: Verbalizes/displays adequate comfort level or baseline comfort level  Description: Interventions:  - Encourage patient to monitor pain and request assistance  - Assess pain using appropriate pain scale  - Administer analgesics based on type and severity of pain and evaluate response  - Implement non-pharmacological measures as appropriate and evaluate response  - Consider cultural and social influences on pain and pain management  - Notify physician/advanced practitioner if interventions unsuccessful or patient reports new pain  Outcome: Completed     Problem: INFECTION - ADULT  Goal: Absence or prevention of progression during hospitalization  Description: INTERVENTIONS:  - Assess and monitor for signs and symptoms of infection  - Monitor lab/diagnostic results  - Monitor all insertion sites, i e  indwelling lines, tubes, and drains  - Monitor endotracheal if appropriate and nasal secretions for changes in amount and color  - [place] appropriate cooling/warming therapies per order  - Administer medications as ordered  - Instruct and encourage patient and family to use good hand hygiene technique  - Identify and instruct in appropriate isolation precautions for identified infection/condition  Outcome: Completed  Goal: Absence of fever/infection during neutropenic period  Description: INTERVENTIONS:  - Monitor WBC    Outcome: Completed     Problem: SAFETY ADULT  Goal: Patient will remain free of falls  Description: INTERVENTIONS:  - Educate patient/family on patient safety including physical limitations  - Instruct patient to call for assistance with activity   - Consult OT/PT to assist with strengthening/mobility   - Keep Call bell within reach  - Keep bed low and locked with side rails adjusted as appropriate  - Keep care items and personal belongings within reach  - Initiate and maintain comfort rounds  - Make Fall Risk Sign visible to staff  - Apply yellow socks and bracelet for high fall risk patients  - Consider moving patient to room near nurses station  Outcome: Completed  Goal: Maintain or return to baseline ADL function  Description: INTERVENTIONS:  -  Assess patient's ability to carry out ADLs; assess patient's baseline for ADL function and identify physical deficits which impact ability to perform ADLs (bathing, care of mouth/teeth, toileting, grooming, dressing, etc )  - Assess/evaluate cause of self-care deficits   - Assess range of motion  - Assess patient's mobility; develop plan if impaired  - Assess patient's need for assistive devices and provide as appropriate  - Encourage maximum independence but intervene and supervise when necessary  - Involve family in performance of ADLs  - Assess for home care needs following discharge   - Consider OT consult to assist with ADL evaluation and planning for discharge  - Provide patient education as appropriate  Outcome: Completed  Goal: Maintains/Returns to pre admission functional level  Description: INTERVENTIONS:  - Perform BMAT or MOVE assessment daily    - Set and communicate daily mobility goal to care team and patient/family/caregiver  - Collaborate with rehabilitation services on mobility goals if consulted  - Out of bed for toileting  - Record patient progress and toleration of activity level   Outcome: Completed     Problem: Knowledge Deficit  Goal: Patient/family/caregiver demonstrates understanding of disease process, treatment plan, medications, and discharge instructions  Description: Complete learning assessment and assess knowledge base    Interventions:  - Provide teaching at level of understanding  - Provide teaching via preferred learning methods  Outcome: Completed     Problem: DISCHARGE PLANNING  Goal: Discharge to home or other facility with appropriate resources  Description: INTERVENTIONS:  - Identify barriers to discharge w/patient and caregiver  - Arrange for needed discharge resources and transportation as appropriate  - Identify discharge learning needs (meds, wound care, etc )  - Arrange for interpretive services to assist at discharge as needed  - Refer to Case Management Department for coordinating discharge planning if the patient needs post-hospital services based on physician/advanced practitioner order or complex needs related to functional status, cognitive ability, or social support system  Outcome: Completed     Problem: BIRTH - VAGINAL/ SECTION  Goal: Fetal and maternal status remain reassuring during the birth process  Description: INTERVENTIONS:  - Monitor vital signs  - Monitor fetal heart rate  - Monitor uterine activity  - Monitor labor progression (vaginal delivery)  - DVT prophylaxis  - Antibiotic prophylaxis  Outcome: Completed  Goal: Emotionally satisfying birthing experience for mother/fetus  Description: Interventions:  - Assess, plan, implement and evaluate the nursing care given to the patient in labor  - Advocate the philosophy that each childbirth experience is a unique experience and support the family's chosen level of involvement and control during the labor process   - Actively participate in both the patient's and family's teaching of the birth process  - Consider cultural, Adventist and age-specific factors and plan care for the patient in labor  Outcome: Completed

## 2022-04-22 NOTE — PROGRESS NOTES
Progress Note - OB/GYN   Felicita Kearney 27 y o  female MRN: [id number]  Unit/Bed#: L&D 311-01 Encounter: [id number]    Assessment:  POD#4 s/p primary low transverse  section, for breech and PPROM stable, baby in room    Plan:  1) Post-op care  - Encourage ambulation  - Encourage breastfeeding  - Continue current meds     2) [place] catheter  -patient voiding spontaneously    3) Contraception   - declines     4) Pain control   - Pain is controlled with current analgesics  5) Postoperative Anemia   - QBL 428mL  - Hgb 12 2 -> 9 6     6) Anxiety  - wellbutrin discontinued, not taken during pregnancy    7) Rh negative  - Rhogam ordered    8) Disposition: home today    Subjective/Objective     Subjective:     Pain: yes, improved with meds  Tolerating PO: yes  Voiding: yes  Flatus: yes  BM: no  Ambulating: yes  Breastfeeding: Breastfeeding  Chest pain: no  Shortness of breath: no  Leg pain: no  Lochia: equivalent to menses     Objective:     Vitals:  Vitals:    22 2300 22 0719 22 1500 22 2300   BP: 114/59 117/73 118/68 111/56   BP Location: Right arm Right arm  Right arm   Pulse: 72 61 70 61   Resp: 18 18 18 16   Temp: 97 7 °F (36 5 °C) 98 1 °F (36 7 °C) 97 7 °F (36 5 °C) 97 5 °F (36 4 °C)   TempSrc: Temporal Oral Oral Oral   SpO2:   97% 96%   Weight:       Height:           Physical Exam:   GEN: The patient was alert and oriented x3, pleasant well-appearing female in no acute distress     CV: Regular rate  PULM: nonlabored respirations  MSK: Normal gait  Skin: warm, dry  Neuro: no focal deficits  Psych: normal affect and judgement, cooperative  Abd: soft, moderate tenderness throughout abdomen,   Uterine fundus firm and non-tender, at the umbilicus, Incision C/D/I        Lab Results   Component Value Date    WBC 17 11 (H) 2022    HGB 9 6 (L) 2022    HCT 28 0 (L) 2022     (H) 2022     2022           Heriberto Kelley MD, PGY-1  Obstetrics & Gynecology  04/22/22  6:43 AM

## 2022-04-22 NOTE — PLAN OF CARE
Problem: PAIN - ADULT  Goal: Verbalizes/displays adequate comfort level or baseline comfort level  Description: Interventions:  - Encourage patient to monitor pain and request assistance  - Assess pain using appropriate pain scale  - Administer analgesics based on type and severity of pain and evaluate response  - Implement non-pharmacological measures as appropriate and evaluate response  - Consider cultural and social influences on pain and pain management  - Notify physician/advanced practitioner if interventions unsuccessful or patient reports new pain  Outcome: Progressing     Problem: INFECTION - ADULT  Goal: Absence or prevention of progression during hospitalization  Description: INTERVENTIONS:  - Assess and monitor for signs and symptoms of infection  - Monitor lab/diagnostic results  - Monitor all insertion sites, i e  indwelling lines, tubes, and drains  - Monitor endotracheal if appropriate and nasal secretions for changes in amount and color  - [place] appropriate cooling/warming therapies per order  - Administer medications as ordered  - Instruct and encourage patient and family to use good hand hygiene technique  - Identify and instruct in appropriate isolation precautions for identified infection/condition  Outcome: Progressing  Goal: Absence of fever/infection during neutropenic period  Description: INTERVENTIONS:  - Monitor WBC    Outcome: Progressing     Problem: SAFETY ADULT  Goal: Patient will remain free of falls  Description: INTERVENTIONS:  - Educate patient/family on patient safety including physical limitations  - Instruct patient to call for assistance with activity   - Consult OT/PT to assist with strengthening/mobility   - Keep Call bell within reach  - Keep bed low and locked with side rails adjusted as appropriate  - Keep care items and personal belongings within reach  - Initiate and maintain comfort rounds  - Make Fall Risk Sign visible to staff  - Apply yellow socks and bracelet for high fall risk patients  - Consider moving patient to room near nurses station  Outcome: Progressing     Problem: SAFETY ADULT  Goal: Patient will remain free of falls  Description: INTERVENTIONS:  - Educate patient/family on patient safety including physical limitations  - Instruct patient to call for assistance with activity   - Consult OT/PT to assist with strengthening/mobility   - Keep Call bell within reach  - Keep bed low and locked with side rails adjusted as appropriate  - Keep care items and personal belongings within reach  - Initiate and maintain comfort rounds  - Make Fall Risk Sign visible to staff  - Apply yellow socks and bracelet for high fall risk patients  - Consider moving patient to room near nurses station  Outcome: Progressing  Goal: Maintain or return to baseline ADL function  Description: INTERVENTIONS:  -  Assess patient's ability to carry out ADLs; assess patient's baseline for ADL function and identify physical deficits which impact ability to perform ADLs (bathing, care of mouth/teeth, toileting, grooming, dressing, etc )  - Assess/evaluate cause of self-care deficits   - Assess range of motion  - Assess patient's mobility; develop plan if impaired  - Assess patient's need for assistive devices and provide as appropriate  - Encourage maximum independence but intervene and supervise when necessary  - Involve family in performance of ADLs  - Assess for home care needs following discharge   - Consider OT consult to assist with ADL evaluation and planning for discharge  - Provide patient education as appropriate  Outcome: Progressing  Goal: Maintains/Returns to pre admission functional level  Description: INTERVENTIONS:  - Perform BMAT or MOVE assessment daily    - Set and communicate daily mobility goal to care team and patient/family/caregiver     - Collaborate with rehabilitation services on mobility goals if consulted  - Out of bed for toileting  - Record patient progress and toleration of activity level   Outcome: Progressing     Problem: Knowledge Deficit  Goal: Patient/family/caregiver demonstrates understanding of disease process, treatment plan, medications, and discharge instructions  Description: Complete learning assessment and assess knowledge base    Interventions:  - Provide teaching at level of understanding  - Provide teaching via preferred learning methods  Outcome: Progressing     Problem: DISCHARGE PLANNING  Goal: Discharge to home or other facility with appropriate resources  Description: INTERVENTIONS:  - Identify barriers to discharge w/patient and caregiver  - Arrange for needed discharge resources and transportation as appropriate  - Identify discharge learning needs (meds, wound care, etc )  - Arrange for interpretive services to assist at discharge as needed  - Refer to Case Management Department for coordinating discharge planning if the patient needs post-hospital services based on physician/advanced practitioner order or complex needs related to functional status, cognitive ability, or social support system  Outcome: Progressing     Problem: BIRTH - VAGINAL/ SECTION  Goal: Fetal and maternal status remain reassuring during the birth process  Description: INTERVENTIONS:  - Monitor vital signs  - Monitor fetal heart rate  - Monitor uterine activity  - Monitor labor progression (vaginal delivery)  - DVT prophylaxis  - Antibiotic prophylaxis  Outcome: Progressing  Goal: Emotionally satisfying birthing experience for mother/fetus  Description: Interventions:  - Assess, plan, implement and evaluate the nursing care given to the patient in labor  - Advocate the philosophy that each childbirth experience is a unique experience and support the family's chosen level of involvement and control during the labor process   - Actively participate in both the patient's and family's teaching of the birth process  - Consider cultural, Episcopalian and age-specific factors and plan care for the patient in labor  Outcome: Progressing

## 2022-04-22 NOTE — NURSING NOTE
Discharge education reviewed with patient and spouse at bedside  Informational packet and "save your life" magnet provided  Opportunity for questions given

## 2022-04-22 NOTE — PROGRESS NOTES
Discharge teaching completed for mom and baby  Stressed safe sleep, shaken baby and car seat safety  Save a life magnet given and explained  Appropriate questions asked and answered  Pt verbalized understanding

## 2022-04-25 ENCOUNTER — TRANSITIONAL CARE MANAGEMENT (OUTPATIENT)
Dept: FAMILY MEDICINE CLINIC | Facility: CLINIC | Age: 32
End: 2022-04-25

## 2022-04-25 RX ORDER — LACTIC ACID, L-, CITRIC ACID MONOHYDRATE, AND POTASSIUM BITARTRATE 90; 50; 20 MG/5G; MG/5G; MG/5G
GEL VAGINAL
COMMUNITY

## 2022-04-25 RX ORDER — ADAPALENE 3 MG/G
GEL TOPICAL
COMMUNITY

## 2023-01-24 ENCOUNTER — TELEPHONE (OUTPATIENT)
Dept: FAMILY MEDICINE CLINIC | Facility: CLINIC | Age: 33
End: 2023-01-24

## 2023-11-28 LAB
EXTERNAL ABO GROUPING: NORMAL
EXTERNAL ANTIBODY SCREEN: NORMAL
EXTERNAL HEMOGLOBIN: 13 G/DL
EXTERNAL HEPATITIS B SURFACE ANTIGEN: NORMAL
EXTERNAL RH FACTOR: NEGATIVE
EXTERNAL RUBELLA IGG QUANTITATION: NORMAL
EXTERNAL SYPHILIS TOTAL IGG/IGM SCREENING: NORMAL

## 2023-12-27 ENCOUNTER — OFFICE VISIT (OUTPATIENT)
Dept: FAMILY MEDICINE CLINIC | Facility: CLINIC | Age: 33
End: 2023-12-27
Payer: COMMERCIAL

## 2023-12-27 VITALS
DIASTOLIC BLOOD PRESSURE: 55 MMHG | TEMPERATURE: 96.5 F | WEIGHT: 127 LBS | BODY MASS INDEX: 21.68 KG/M2 | OXYGEN SATURATION: 99 % | HEIGHT: 64 IN | SYSTOLIC BLOOD PRESSURE: 114 MMHG | HEART RATE: 75 BPM

## 2023-12-27 DIAGNOSIS — Z00.00 ANNUAL PHYSICAL EXAM: Primary | ICD-10-CM

## 2023-12-27 PROCEDURE — 99395 PREV VISIT EST AGE 18-39: CPT | Performed by: FAMILY MEDICINE

## 2023-12-27 NOTE — PROGRESS NOTES
ADULT ANNUAL PHYSICAL  [facility]    NAME: Misty Cutler  AGE: 33 y.o. SEX: female  : 1990     DATE: 2023     Assessment and Plan:     Problem List Items Addressed This Visit    None  Visit Diagnoses     Annual physical exam    -  Primary            Immunizations and preventive care screenings were discussed with patient today. Appropriate education was printed on patient's after visit summary.    Counseling:  Alcohol/drug use: discussed moderation in alcohol intake, the recommendations for healthy alcohol use, and avoidance of illicit drug use.  Dental Health: discussed importance of regular tooth brushing, flossing, and dental visits.  Injury prevention: discussed safety/seat belts, safety helmets, smoke detectors, carbon dioxide detectors, and smoking near bedding or upholstery.  Sexual health: discussed sexually transmitted diseases, partner selection, use of condoms, avoidance of unintended pregnancy, and contraceptive alternatives.  Exercise: the importance of regular exercise/physical activity was discussed. Recommend exercise 3-5 times per week for at least 30 minutes.       Depression Screening and Follow-up Plan: Patient was screened for depression during today's encounter. They screened negative with a PHQ-2 score of 0.        Return in 1 year (on 2024).     Chief Complaint:     Chief Complaint   Patient presents with   • Physical Exam     Pt needs her insurance paper filled out for her life insurance policy no other problems or concerns betzy       History of Present Illness:     Adult Annual Physical   Patient here for a comprehensive physical exam. The patient reports no problems.    Diet and Physical Activity  Diet/Nutrition: well balanced diet.   Exercise: moderate cardiovascular exercise.      Depression Screening  PHQ-2/9 Depression Screening    Little interest or pleasure in doing things: 0 - not at  all  Feeling down, depressed, or hopeless: 0 - not at all  PHQ-2 Score: 0  PHQ-2 Interpretation: Negative depression screen       General Health  Sleep: sleeps well.   Hearing: normal - bilateral.  Vision: no vision problems.   Dental: regular dental visits.       /GYN Health  Follows with gynecology? yes   Last menstrual period: 2023  Contraceptive method:  na .  History of STDs?: no.     Advanced Care Planning  Do you have an advanced directive? no  Do you have a durable medical power of ? no     Review of Systems:     Review of Systems   Constitutional: Negative.    HENT: Negative.     Eyes: Negative.    Respiratory: Negative.     Cardiovascular: Negative.    Gastrointestinal: Negative.    Endocrine: Negative.    Genitourinary: Negative.    Musculoskeletal: Negative.    Skin: Negative.    Allergic/Immunologic: Negative.    Neurological: Negative.    Hematological: Negative.    Psychiatric/Behavioral: Negative.     All other systems reviewed and are negative.     Past Medical History:     Past Medical History:   Diagnosis Date   • Celiac disease       Past Surgical History:     Past Surgical History:   Procedure Laterality Date   • EGD     • KNEE SURGERY     • OR  DELIVERY ONLY N/A 2022    Procedure:  SECTION ();  Surgeon: Kelly Lomeli DO;  Location: [facility];  Service: Obstetrics      Social History:     Social History     Socioeconomic History   • Marital status: /Civil Union     Spouse name: None   • Number of children: None   • Years of education: None   • Highest education level: None   Occupational History     Employer: [employer]   Tobacco Use   • Smoking status: Never   • Smokeless tobacco: Never   Vaping Use   • Vaping status: Never Used   Substance and Sexual Activity   • Alcohol use: Yes     Comment: rarely   • Drug use: Never   • Sexual activity: Yes   Other Topics Concern   • None   Social History Narrative   • None     Social Determinants of Health      Financial Resource Strain: Low Risk  (4/19/2021)    Overall Financial Resource Strain (CARDIA)    • Difficulty of Paying Living Expenses: Not hard at all   Food Insecurity: No Food Insecurity (4/19/2021)    Hunger Vital Sign    • Worried About Running Out of Food in the Last Year: Never true    • Ran Out of Food in the Last Year: Never true   Transportation Needs: No Transportation Needs (4/19/2021)    PRAPARE - Transportation    • Lack of Transportation (Medical): No    • Lack of Transportation (Non-Medical): No   Physical Activity: Sufficiently Active (4/19/2021)    Exercise Vital Sign    • Days of Exercise per Week: 7 days    • Minutes of Exercise per Session: 60 min   Stress: Stress Concern Present (4/19/2021)    [place] [employer] - Occupational Stress Questionnaire    • Feeling of Stress : To some extent   Social Connections: Moderately Integrated (4/19/2021)    Social Connection and Isolation Panel [NHANES]    • Frequency of Communication with Friends and Family: More than three times a week    • Frequency of Social Gatherings with Friends and Family: More than three times a week    • Attends Taoist Services: 1 to 4 times per year    • Active Member of Clubs or Organizations: No    • Attends Club or Organization Meetings: Never    • Marital Status:    Intimate Partner Violence: Not At Risk (4/19/2021)    Humiliation, Afraid, Rape, and Kick questionnaire    • Fear of Current or Ex-Partner: No    • Emotionally Abused: No    • Physically Abused: No    • Sexually Abused: No   Housing Stability: Not on file      Family History:     Family History   Problem Relation Age of Onset   • Celiac disease Mother    • Colon cancer Maternal Grandfather    • Lung cancer Maternal Grandfather       Current Medications:     Current Outpatient Medications   Medication Sig Dispense Refill   • Acetaminophen (TYLENOL PO) Tylenol     • Prenatal w/o A Vit-Fe Fum-FA (PRENATAL-H PO) Prenatal       No  "current facility-administered medications for this visit.      Allergies:     Allergies   Allergen Reactions   • Gluten Meal - Food Allergy      Other reaction(s): GI issues   • Penicillins Hives      Physical Exam:     /55 (BP Location: Left arm, Patient Position: Sitting, Cuff Size: Standard)   Pulse 75   Temp (!) 96.5 °F (35.8 °C) (Temporal)   Ht 5' 4\" (1.626 m)   Wt 57.6 kg (127 lb)   SpO2 99%   BMI 21.80 kg/m²     Physical Exam  Vitals and nursing note reviewed.   Constitutional:       Appearance: Normal appearance. She is well-developed.   HENT:      Head: Normocephalic.   Eyes:      Pupils: Pupils are equal, round, and reactive to light.   Cardiovascular:      Rate and Rhythm: Normal rate and regular rhythm.      Heart sounds: Normal heart sounds.   Pulmonary:      Effort: Pulmonary effort is normal.      Breath sounds: Normal breath sounds.   Abdominal:      General: Bowel sounds are normal.      Palpations: Abdomen is soft.   Musculoskeletal:      Cervical back: Normal range of motion and neck supple.   Skin:     General: Skin is warm and dry.      Capillary Refill: Capillary refill takes less than 2 seconds.   Neurological:      General: No focal deficit present.      Mental Status: She is alert and oriented to person, place, and time.   Psychiatric:         Mood and Affect: Mood normal.         Behavior: Behavior normal.          Ti Feng DO   [facility]    "

## 2024-02-02 PROBLEM — L70.9 ACNE: Status: RESOLVED | Noted: 2019-09-13 | Resolved: 2024-02-02

## 2024-02-02 PROBLEM — O42.919 PRETERM PREMATURE RUPTURE OF MEMBRANES (PPROM) WITH UNKNOWN ONSET OF LABOR: Status: RESOLVED | Noted: 2022-04-17 | Resolved: 2024-02-02

## 2024-02-02 PROBLEM — O34.219 HISTORY OF CESAREAN DELIVERY, ANTEPARTUM: Status: ACTIVE | Noted: 2024-02-02

## 2024-02-02 PROBLEM — F41.9 ANXIETY: Status: RESOLVED | Noted: 2020-12-24 | Resolved: 2024-02-02

## 2024-02-02 PROBLEM — Z98.891 S/P PRIMARY LOW TRANSVERSE C-SECTION: Status: RESOLVED | Noted: 2022-04-18 | Resolved: 2024-02-02

## 2024-02-02 PROBLEM — R03.0 ELEVATED BP WITHOUT DIAGNOSIS OF HYPERTENSION: Status: RESOLVED | Noted: 2022-04-17 | Resolved: 2024-02-02

## 2024-02-02 PROBLEM — Z82.79 FAMILY HISTORY OF VSD (VENTRICULAR SEPTAL DEFECT): Status: ACTIVE | Noted: 2024-02-02

## 2024-02-02 PROBLEM — Z83.2 FAMILY HISTORY OF PROTEIN C DEFICIENCY: Status: ACTIVE | Noted: 2024-02-02

## 2024-02-02 PROBLEM — O09.899 HISTORY OF PRETERM DELIVERY, CURRENTLY PREGNANT: Status: ACTIVE | Noted: 2024-02-02

## 2024-02-02 PROBLEM — Z3A.35 35 WEEKS GESTATION OF PREGNANCY: Status: RESOLVED | Noted: 2022-04-17 | Resolved: 2024-02-02

## 2024-02-06 ENCOUNTER — ROUTINE PRENATAL (OUTPATIENT)
Dept: PERINATAL CARE | Facility: OTHER | Age: 34
End: 2024-02-06

## 2024-02-06 VITALS
HEART RATE: 67 BPM | WEIGHT: 144.6 LBS | SYSTOLIC BLOOD PRESSURE: 118 MMHG | HEIGHT: 64 IN | DIASTOLIC BLOOD PRESSURE: 72 MMHG | BODY MASS INDEX: 24.69 KG/M2

## 2024-02-06 DIAGNOSIS — O34.219 HISTORY OF CESAREAN DELIVERY, ANTEPARTUM: Primary | ICD-10-CM

## 2024-02-06 DIAGNOSIS — O09.899 SUPERVISION OF OTHER HIGH RISK PREGNANCIES, UNSPECIFIED TRIMESTER: ICD-10-CM

## 2024-02-06 DIAGNOSIS — O09.899 HISTORY OF PRETERM DELIVERY, CURRENTLY PREGNANT: ICD-10-CM

## 2024-02-06 DIAGNOSIS — Z82.79 FAMILY HISTORY OF VSD (VENTRICULAR SEPTAL DEFECT): ICD-10-CM

## 2024-02-06 DIAGNOSIS — Z83.2 FAMILY HISTORY OF PROTEIN C DEFICIENCY: ICD-10-CM

## 2024-02-06 DIAGNOSIS — Z3A.20 20 WEEKS GESTATION OF PREGNANCY: ICD-10-CM

## 2024-02-06 PROCEDURE — 99243 OFF/OP CNSLTJ NEW/EST LOW 30: CPT | Performed by: OBSTETRICS & GYNECOLOGY

## 2024-02-06 PROCEDURE — 76817 TRANSVAGINAL US OBSTETRIC: CPT | Performed by: OBSTETRICS & GYNECOLOGY

## 2024-02-06 PROCEDURE — 76805 OB US >/= 14 WKS SNGL FETUS: CPT | Performed by: OBSTETRICS & GYNECOLOGY

## 2024-02-06 RX ORDER — MAGNESIUM 30 MG
30 TABLET ORAL 2 TIMES DAILY
COMMUNITY

## 2024-02-06 NOTE — PROGRESS NOTES
"[facility]: Ms. Cutler was seen today for anatomic survey and cervical length screening ultrasound.  See ultrasound report under \"OB Procedures\" tab.      MDM:   I. Diagnoses/Problems addressed:  Self limited or minor problem: uncomplicated pregnancy  II.  Data: I reviewed notes from referring provider.  III.  Risk of morbidity: Low    Please don't hesitate to contact our office with any concerns or questions.  -Jazmine Phelps MD      "

## 2024-02-06 NOTE — PROGRESS NOTES
Ultrasound Probe Disinfection    A transvaginal ultrasound was performed.   Prior to use, disinfection was performed with High Level Disinfection Process ([employer]).  Probe serial number [id number]: [id number]  was used.    Chaperone: PATEL Croft RDMS

## 2024-02-06 NOTE — LETTER
2024    Kelly Lomeli, DO  [address] [facility]  [address] [place] [address]    Patient: Misty Cutler   YOB: 1990   Date of Visit: 2024   Nature of this communication: Routine       Dear Kelly,    This patient was seen recently in our  office.  Consultation is contained in body of ultrasound report which has been faxed to you under separate cover; please contact us if you do not receive this.    Please don't hesitate to contact our office with any concerns or questions.     Sincerely,      Jazmine Phelps MD  Attending Physician, Maternal-Fetal Medicine  [facility]

## 2024-02-25 NOTE — PROGRESS NOTES
Please refer to the [facility] ultrasound report in Ob Procedures for additional information regarding today's visit

## 2024-02-27 ENCOUNTER — ROUTINE PRENATAL (OUTPATIENT)
Dept: PERINATAL CARE | Facility: OTHER | Age: 34
End: 2024-02-27
Payer: COMMERCIAL

## 2024-02-27 VITALS
HEART RATE: 99 BPM | BODY MASS INDEX: 25.33 KG/M2 | HEIGHT: 64 IN | WEIGHT: 148.4 LBS | DIASTOLIC BLOOD PRESSURE: 64 MMHG | SYSTOLIC BLOOD PRESSURE: 118 MMHG

## 2024-02-27 DIAGNOSIS — Z3A.23 23 WEEKS GESTATION OF PREGNANCY: Primary | ICD-10-CM

## 2024-02-27 DIAGNOSIS — O35.2XX0 PREVIOUS CHILD BORN WITH VENTRICULAR SEPTAL DEFECT, CURRENTLY PREGNANT, SINGLE OR UNSPECIFIED FETUS: ICD-10-CM

## 2024-02-27 PROCEDURE — 76825 ECHO EXAM OF FETAL HEART: CPT | Performed by: OBSTETRICS & GYNECOLOGY

## 2024-02-27 PROCEDURE — 76827 ECHO EXAM OF FETAL HEART: CPT | Performed by: OBSTETRICS & GYNECOLOGY

## 2024-02-27 PROCEDURE — 93325 DOPPLER ECHO COLOR FLOW MAPG: CPT | Performed by: OBSTETRICS & GYNECOLOGY

## 2024-02-27 RX ORDER — OFLOXACIN 3 MG/ML
1 SOLUTION/ DROPS OPHTHALMIC 4 TIMES DAILY
COMMUNITY
Start: 2024-02-24 | End: 2024-03-02

## 2024-02-27 NOTE — LETTER
February 27, 2024     Kelly Lomeli DO  [address] [address]    Patient: Misty Cutler   YOB: 1990   Date of Visit: 2/27/2024       Dear Dr. Lomeli:    Thank you for referring Misty Cutler to me for evaluation. Below are my notes for this consultation.    If you have questions, please do not hesitate to call me. I look forward to following your patient along with you.         Sincerely,        Mart Marcelino MD        CC: No Recipients    Mart Marcelino MD  2/27/2024  8:14 AM  Sign when Signing Visit  Please refer to the [facility] ultrasound report in Ob Procedures for additional information regarding today's visit

## 2024-04-02 LAB
EXTERNAL HEMATOCRIT: 38.5 %
EXTERNAL PLATELET COUNT: 258 K/ÂΜL
EXTERNAL RHOGAM GIVEN?: YES
EXTERNAL SYPHILIS TOTAL IGG/IGM SCREENING: NORMAL
GLUCOSE 1H P 50 G GLC PO SERPL-MCNC: 102 MG/DL (ref 70–134)

## 2024-05-21 ENCOUNTER — LAB REQUISITION (OUTPATIENT)
Dept: LAB | Facility: HOSPITAL | Age: 34
End: 2024-05-21
Payer: COMMERCIAL

## 2024-05-21 DIAGNOSIS — Z36.85 ENCOUNTER FOR ANTENATAL SCREENING FOR STREPTOCOCCUS B: ICD-10-CM

## 2024-05-21 PROCEDURE — 87150 DNA/RNA AMPLIFIED PROBE: CPT | Performed by: OBSTETRICS & GYNECOLOGY

## 2024-05-23 LAB — GP B STREP DNA SPEC QL NAA+PROBE: NEGATIVE

## 2024-06-22 ENCOUNTER — HOSPITAL ENCOUNTER (INPATIENT)
Facility: HOSPITAL | Age: 34
LOS: 2 days | Discharge: HOME | End: 2024-06-25
Attending: OBSTETRICS & GYNECOLOGY | Admitting: OBSTETRICS & GYNECOLOGY
Payer: COMMERCIAL

## 2024-06-22 DIAGNOSIS — O16.3 ELEVATED BLOOD PRESSURE AFFECTING PREGNANCY IN THIRD TRIMESTER, ANTEPARTUM: ICD-10-CM

## 2024-06-22 DIAGNOSIS — O13.9 GESTATIONAL HYPERTENSION: ICD-10-CM

## 2024-06-22 DIAGNOSIS — O34.219 HISTORY OF CESAREAN DELIVERY, ANTEPARTUM: ICD-10-CM

## 2024-06-22 DIAGNOSIS — Z3A.40 40 WEEKS GESTATION OF PREGNANCY: Primary | ICD-10-CM

## 2024-06-22 DIAGNOSIS — O09.899 HISTORY OF PRETERM DELIVERY, CURRENTLY PREGNANT: ICD-10-CM

## 2024-06-22 LAB
ALBUMIN SERPL BCG-MCNC: 3.4 G/DL (ref 3.5–5)
ALP SERPL-CCNC: 246 U/L (ref 34–104)
ALT SERPL W P-5'-P-CCNC: 9 U/L (ref 7–52)
ANION GAP SERPL CALCULATED.3IONS-SCNC: 8 MMOL/L (ref 4–13)
AST SERPL W P-5'-P-CCNC: 14 U/L (ref 13–39)
BASOPHILS # BLD AUTO: 0.04 THOUSANDS/ÂΜL (ref 0–0.1)
BASOPHILS NFR BLD AUTO: 0 % (ref 0–1)
BILIRUB SERPL-MCNC: 0.38 MG/DL (ref 0.2–1)
BUN SERPL-MCNC: 11 MG/DL (ref 5–25)
CALCIUM ALBUM COR SERPL-MCNC: 10.2 MG/DL (ref 8.3–10.1)
CALCIUM SERPL-MCNC: 9.7 MG/DL (ref 8.4–10.2)
CHLORIDE SERPL-SCNC: 105 MMOL/L (ref 96–108)
CO2 SERPL-SCNC: 21 MMOL/L (ref 21–32)
CREAT SERPL-MCNC: 0.59 MG/DL (ref 0.6–1.3)
CREAT UR-MCNC: 59.9 MG/DL
EOSINOPHIL # BLD AUTO: 0.08 THOUSAND/ÂΜL (ref 0–0.61)
EOSINOPHIL NFR BLD AUTO: 1 % (ref 0–6)
ERYTHROCYTE [DISTWIDTH] IN BLOOD BY AUTOMATED COUNT: 12.2 % (ref 11.6–15.1)
GFR SERPL CREATININE-BSD FRML MDRD: 119 ML/MIN/1.73SQ M
GLUCOSE SERPL-MCNC: 99 MG/DL (ref 65–140)
HCT VFR BLD AUTO: 38.6 % (ref 34.8–46.1)
HGB BLD-MCNC: 13.5 G/DL (ref 11.5–15.4)
IMM GRANULOCYTES # BLD AUTO: 0.05 THOUSAND/UL (ref 0–0.2)
IMM GRANULOCYTES NFR BLD AUTO: 1 % (ref 0–2)
LYMPHOCYTES # BLD AUTO: 2.2 THOUSANDS/ÂΜL (ref 0.6–4.47)
LYMPHOCYTES NFR BLD AUTO: 21 % (ref 14–44)
MCH RBC QN AUTO: 32.6 PG (ref 26.8–34.3)
MCHC RBC AUTO-ENTMCNC: 35 G/DL (ref 31.4–37.4)
MCV RBC AUTO: 93 FL (ref 82–98)
MONOCYTES # BLD AUTO: 0.76 THOUSAND/ÂΜL (ref 0.17–1.22)
MONOCYTES NFR BLD AUTO: 7 % (ref 4–12)
NEUTROPHILS # BLD AUTO: 7.26 THOUSANDS/ÂΜL (ref 1.85–7.62)
NEUTS SEG NFR BLD AUTO: 70 % (ref 43–75)
NRBC BLD AUTO-RTO: 0 /100 WBCS
PLATELET # BLD AUTO: 189 THOUSANDS/UL (ref 149–390)
PMV BLD AUTO: 10.3 FL (ref 8.9–12.7)
POTASSIUM SERPL-SCNC: 3.7 MMOL/L (ref 3.5–5.3)
PROT SERPL-MCNC: 6.3 G/DL (ref 6.4–8.4)
PROT UR-MCNC: 14 MG/DL
PROT/CREAT UR: 0.23 MG/G{CREAT} (ref 0–0.1)
RBC # BLD AUTO: 4.14 MILLION/UL (ref 3.81–5.12)
SODIUM SERPL-SCNC: 134 MMOL/L (ref 135–147)
WBC # BLD AUTO: 10.39 THOUSAND/UL (ref 4.31–10.16)

## 2024-06-22 PROCEDURE — 84156 ASSAY OF PROTEIN URINE: CPT

## 2024-06-22 PROCEDURE — 82570 ASSAY OF URINE CREATININE: CPT

## 2024-06-22 PROCEDURE — 80053 COMPREHEN METABOLIC PANEL: CPT

## 2024-06-22 PROCEDURE — 85025 COMPLETE CBC W/AUTO DIFF WBC: CPT

## 2024-06-23 ENCOUNTER — ANESTHESIA (INPATIENT)
Dept: ANESTHESIOLOGY | Facility: HOSPITAL | Age: 34
End: 2024-06-23
Payer: COMMERCIAL

## 2024-06-23 ENCOUNTER — ANESTHESIA EVENT (INPATIENT)
Dept: ANESTHESIOLOGY | Facility: HOSPITAL | Age: 34
End: 2024-06-23
Payer: COMMERCIAL

## 2024-06-23 PROBLEM — Z3A.40 40 WEEKS GESTATION OF PREGNANCY: Status: ACTIVE | Noted: 2024-06-23

## 2024-06-23 PROBLEM — O16.3 ELEVATED BLOOD PRESSURE AFFECTING PREGNANCY IN THIRD TRIMESTER, ANTEPARTUM: Status: ACTIVE | Noted: 2024-06-23

## 2024-06-23 LAB
ABO GROUP BLD: NORMAL
BLD GP AB SCN SERPL QL: NEGATIVE
RH BLD: NEGATIVE
SPECIMEN EXPIRATION DATE: NORMAL

## 2024-06-23 PROCEDURE — 86850 RBC ANTIBODY SCREEN: CPT

## 2024-06-23 PROCEDURE — 99203 OFFICE O/P NEW LOW 30 MIN: CPT

## 2024-06-23 PROCEDURE — 86900 BLOOD TYPING SEROLOGIC ABO: CPT

## 2024-06-23 PROCEDURE — 86901 BLOOD TYPING SEROLOGIC RH(D): CPT

## 2024-06-23 PROCEDURE — NC001 PR NO CHARGE: Performed by: OBSTETRICS & GYNECOLOGY

## 2024-06-23 PROCEDURE — 86780 TREPONEMA PALLIDUM: CPT

## 2024-06-23 RX ORDER — CALCIUM CARBONATE 500 MG/1
1000 TABLET, CHEWABLE ORAL 2 TIMES DAILY PRN
Status: DISCONTINUED | OUTPATIENT
Start: 2024-06-23 | End: 2024-06-24

## 2024-06-23 RX ORDER — ZOLPIDEM TARTRATE 5 MG/1
5 TABLET ORAL
Status: DISCONTINUED | OUTPATIENT
Start: 2024-06-23 | End: 2024-06-23

## 2024-06-23 RX ORDER — ZOLPIDEM TARTRATE 5 MG/1
5 TABLET ORAL
Status: DISCONTINUED | OUTPATIENT
Start: 2024-06-23 | End: 2024-06-24

## 2024-06-23 RX ORDER — LORAZEPAM 2 MG/ML
1 INJECTION INTRAMUSCULAR ONCE
Status: COMPLETED | OUTPATIENT
Start: 2024-06-23 | End: 2024-06-23

## 2024-06-23 RX ORDER — BUPIVACAINE HYDROCHLORIDE 2.5 MG/ML
30 INJECTION, SOLUTION EPIDURAL; INFILTRATION; INTRACAUDAL ONCE AS NEEDED
Status: DISCONTINUED | OUTPATIENT
Start: 2024-06-23 | End: 2024-06-24

## 2024-06-23 RX ORDER — OXYTOCIN/RINGER'S LACTATE 30/500 ML
1-30 PLASTIC BAG, INJECTION (ML) INTRAVENOUS
Status: DISCONTINUED | OUTPATIENT
Start: 2024-06-23 | End: 2024-06-24

## 2024-06-23 RX ORDER — SODIUM CHLORIDE, SODIUM LACTATE, POTASSIUM CHLORIDE, CALCIUM CHLORIDE 600; 310; 30; 20 MG/100ML; MG/100ML; MG/100ML; MG/100ML
125 INJECTION, SOLUTION INTRAVENOUS CONTINUOUS
Status: DISCONTINUED | OUTPATIENT
Start: 2024-06-23 | End: 2024-06-24

## 2024-06-23 RX ORDER — ACETAMINOPHEN 325 MG/1
650 TABLET ORAL EVERY 6 HOURS PRN
Status: DISCONTINUED | OUTPATIENT
Start: 2024-06-23 | End: 2024-06-24

## 2024-06-23 RX ORDER — LIDOCAINE HYDROCHLORIDE AND EPINEPHRINE 15; 5 MG/ML; UG/ML
INJECTION, SOLUTION EPIDURAL AS NEEDED
Status: DISCONTINUED | OUTPATIENT
Start: 2024-06-23 | End: 2024-06-24 | Stop reason: HOSPADM

## 2024-06-23 RX ORDER — HYDROXYZINE HYDROCHLORIDE 25 MG/1
25 TABLET, FILM COATED ORAL EVERY 6 HOURS PRN
Status: DISCONTINUED | OUTPATIENT
Start: 2024-06-23 | End: 2024-06-24

## 2024-06-23 RX ORDER — ROPIVACAINE HYDROCHLORIDE 2 MG/ML
INJECTION, SOLUTION EPIDURAL; INFILTRATION; PERINEURAL AS NEEDED
Status: DISCONTINUED | OUTPATIENT
Start: 2024-06-23 | End: 2024-06-24 | Stop reason: HOSPADM

## 2024-06-23 RX ORDER — ONDANSETRON 2 MG/ML
4 INJECTION INTRAMUSCULAR; INTRAVENOUS EVERY 6 HOURS PRN
Status: DISCONTINUED | OUTPATIENT
Start: 2024-06-23 | End: 2024-06-24

## 2024-06-23 RX ADMIN — Medication 2 MILLI-UNITS/MIN: at 12:23

## 2024-06-23 RX ADMIN — ZOLPIDEM TARTRATE 5 MG: 5 TABLET, COATED ORAL at 20:30

## 2024-06-23 RX ADMIN — SODIUM CHLORIDE, POTASSIUM CHLORIDE, SODIUM LACTATE AND CALCIUM CHLORIDE 125 ML/HR: 600; 310; 30; 20 INJECTION, SOLUTION INTRAVENOUS at 19:44

## 2024-06-23 RX ADMIN — LORAZEPAM 1 MG: 2 INJECTION INTRAMUSCULAR; INTRAVENOUS at 19:45

## 2024-06-23 RX ADMIN — MORPHINE SULFATE 2 MG: 2 INJECTION, SOLUTION INTRAMUSCULAR; INTRAVENOUS at 05:17

## 2024-06-23 RX ADMIN — SODIUM CHLORIDE, POTASSIUM CHLORIDE, SODIUM LACTATE AND CALCIUM CHLORIDE 125 ML/HR: 600; 310; 30; 20 INJECTION, SOLUTION INTRAVENOUS at 05:18

## 2024-06-23 RX ADMIN — ROPIVACAINE HYDROCHLORIDE: 2 INJECTION, SOLUTION EPIDURAL; INFILTRATION at 15:40

## 2024-06-23 RX ADMIN — ROPIVACAINE HYDROCHLORIDE 8 ML/HR: 2 INJECTION, SOLUTION EPIDURAL; INFILTRATION at 15:39

## 2024-06-23 RX ADMIN — HYDROXYZINE HYDROCHLORIDE 25 MG: 25 TABLET ORAL at 17:08

## 2024-06-23 RX ADMIN — SODIUM CHLORIDE, POTASSIUM CHLORIDE, SODIUM LACTATE AND CALCIUM CHLORIDE 125 ML/HR: 600; 310; 30; 20 INJECTION, SOLUTION INTRAVENOUS at 15:06

## 2024-06-23 RX ADMIN — LIDOCAINE HYDROCHLORIDE AND EPINEPHRINE 3 ML: 15; 5 INJECTION, SOLUTION EPIDURAL at 15:24

## 2024-06-23 RX ADMIN — SODIUM CHLORIDE, POTASSIUM CHLORIDE, SODIUM LACTATE AND CALCIUM CHLORIDE 125 ML/HR: 600; 310; 30; 20 INJECTION, SOLUTION INTRAVENOUS at 12:22

## 2024-06-23 RX ADMIN — ROPIVACAINE HYDROCHLORIDE 8 ML: 2 INJECTION, SOLUTION EPIDURAL; INFILTRATION at 15:33

## 2024-06-23 NOTE — PROGRESS NOTES
Spoke with Dr. Sams & he instructed me to turn epidural off for 45 minutes. I informed patient of Dr. Schilling' instructions & she states that she is okay with turning epidural off at this time.

## 2024-06-23 NOTE — PROGRESS NOTES
Dr. Huitron states that oxytocin can now be increased per protocol. Patient is aware, however, patient states that she cannot handle the numb feeling in her legs from the epidural & she cannot handle the pain of the contractions when she feels them. Patient states that her legs still feel numb from the epidural & it is causing her to have much anxiety. Patient states that she has not felt her contractions since the epidural was turned off. Epidural is still off. Dr. Huitron is aware & at bedside speaking with patient. Dr. Huitron states that she will speak with Dr. Lomeli to discuss plan/options. Patient agreeable. To keep epidural off per Dr. Huitron until she speaks with Dr. Lomeli.

## 2024-06-23 NOTE — PROGRESS NOTES
Patient is crying & states that she feels so anxious because of her legs feeling numb from the epidural. Patient is requesting that epidural be turned down. Will notify anesthesiologist.

## 2024-06-23 NOTE — OB LABOR/OXYTOCIN SAFETY PROGRESS
Labor Progress Note - Misty Cutler 33 y.o. female MRN: [id number]    Unit/Bed#: -01 Encounter: [id number]       Contraction Frequency (minutes): irregular/irritability present  Contraction Intensity: Mild/Moderate  Uterine Activity Characteristics: Irregular  Cervical Dilation: 4-5        Cervical Effacement: 70  Fetal Station: -3  Baseline Rate (FHR): 130 bpm  Fetal Heart Rate (FHT): 136 BPM  FHR Category: 1               Vital Signs:   Vitals:    06/23/24 1107   BP: 129/81   Pulse: 81   Resp: 16   Temp:        Notes/comments:   Palma balloon is out. SVE as above. Will start pitocin     Freddy Joseph MD 6/23/2024 11:43 AM

## 2024-06-23 NOTE — PROGRESS NOTES
Patient states that she feels anxious & is requesting medication to help with her anxiety. Dr. Joseph is notified & states that he will order atarax. Patient is aware.

## 2024-06-23 NOTE — ASSESSMENT & PLAN NOTE
Routine postpartum care  Encourage ambulation  Encourage familial bonding  Lactation support as needed  Pain: Motrin/Tylenol around the clock  Declines contraception

## 2024-06-23 NOTE — ANESTHESIA PREPROCEDURE EVALUATION
Procedure:  LABOR ANALGESIA    Relevant Problems   ANESTHESIA (within normal limits)      CARDIO (within normal limits)      ENDO (within normal limits)      GI/HEPATIC (within normal limits)      /RENAL (within normal limits)      GYN   (+) 40 weeks gestation of pregnancy      HEMATOLOGY (within normal limits)      MUSCULOSKELETAL (within normal limits)      NEURO/PSYCH (within normal limits)      PULMONARY (within normal limits)        Physical Exam    Airway    Mallampati score: III         Dental   No notable dental hx     Cardiovascular  Rhythm: regular, Rate: normal, Cardiovascular exam normal    Pulmonary  Pulmonary exam normal Breath sounds clear to auscultation    Other Findings  post-pubertal.      Anesthesia Plan  ASA Score- 2     Anesthesia Type- epidural with ASA Monitors.         Additional Monitors:     Airway Plan:            Plan Factors-Exercise tolerance (METS): >4 METS.    Chart reviewed.   Existing labs reviewed. Patient summary reviewed.    Patient is not a current smoker.              Induction-     Postoperative Plan-     Perioperative Resuscitation Plan - Level 1 - Full Code.       Informed Consent- Anesthetic plan and risks discussed with patient and spouse.

## 2024-06-23 NOTE — OB LABOR/OXYTOCIN SAFETY PROGRESS
Labor Progress Note - Misty Cutler 33 y.o. female MRN: [id number]    Unit/Bed#: -01 Encounter: [id number]       Contraction Frequency (minutes): 1-6  Contraction Intensity: Mild  Uterine Activity Characteristics: Irregular  Cervical Dilation: 1        Cervical Effacement: 60  Fetal Station: -3  Baseline Rate (FHR): 130 bpm     FHR Category: 1               Vital Signs:   Vitals:    06/23/24 0237   BP: 132/84   Pulse: 68   Resp:    Temp:        Notes/comments:   SVE unchanged from prior. Still feeling contractions, slightly improved after taking a shower.  Pt amenable to FB placement. Would like PRN pain medication if she gets too uncomfortable with balloon. One time dose morphine ordered.     PROCEDURE:  KEITA BALLOON PLACEMENT    A 24F keita with a 30cc balloon was selected, a speculum examination was performed and the cervix was located. A keita balloon was introduced over sterile gloved hands. Balloon advanced through cervix with a ringed forceps beyond the internal cervical os. A small amount amount of sterile saline solution was instilled in the balloon to confirm placement. Placement was confirmed to be beyond the internal cervical os. A total of 60cc of sterile saline solution was placed into the balloon. Pt tolerated well. Instructions left with RN to place keita to gravity with a 1L bag of IV fluid. Notify /MD when keita dislodged.         Gm Hannon DO 6/23/2024 5:10 AM

## 2024-06-23 NOTE — PLAN OF CARE
Problem: Knowledge Deficit  Goal: Verbalizes understanding of labor plan  Description: Assess patient/family/caregiver's baseline knowledge level and ability to understand information.  Provide education via patient/family/caregiver's preferred learning method at appropriate level of understanding.     1. Provide teaching at level of understanding.  2. Provide teaching via preferred learning method(s).  Outcome: Progressing     Problem: Labor & Delivery  Goal: Manages discomfort  Description: Assess and monitor for signs and symptoms of discomfort.  Assess patient's pain level regularly and per hospital policy.  Administer medications as ordered. Support use of nonpharmacological methods to help control pain such as distraction, imagery, relaxation, and application of heat and cold.  Collaborate with interdisciplinary team and patient to determine appropriate pain management plan.    1. Include patient in decisions related to comfort.  2. Offer non-pharmacological pain management interventions.  3. Report ineffective pain management to physician.  Outcome: Progressing  Goal: Patient vital signs are stable  Description: 1. Assess vital signs - vaginal delivery.  Outcome: Progressing     Problem: PAIN - ADULT  Goal: Verbalizes/displays adequate comfort level or baseline comfort level  Description: Interventions:  - Encourage patient to monitor pain and request assistance  - Assess pain using appropriate pain scale  - Administer analgesics based on type and severity of pain and evaluate response  - Implement non-pharmacological measures as appropriate and evaluate response  - Consider cultural and social influences on pain and pain management  - Notify physician/advanced practitioner if interventions unsuccessful or patient reports new pain  Outcome: Progressing     Problem: INFECTION - ADULT  Goal: Absence or prevention of progression during hospitalization  Description: INTERVENTIONS:  - Assess and monitor for  signs and symptoms of infection  - Monitor lab/diagnostic results  - Monitor all insertion sites, i.e. indwelling lines, tubes, and drains  - Monitor endotracheal if appropriate and nasal secretions for changes in amount and color  - [place] appropriate cooling/warming therapies per order  - Administer medications as ordered  - Instruct and encourage patient and family to use good hand hygiene technique  - Identify and instruct in appropriate isolation precautions for identified infection/condition  Outcome: Progressing     Problem: SAFETY ADULT  Goal: Patient will remain free of falls  Description: INTERVENTIONS:  - Educate patient/family on patient safety including physical limitations  - Instruct patient to call for assistance with activity   - Consult OT/PT to assist with strengthening/mobility   - Keep Call bell within reach  - Keep bed low and locked with side rails adjusted as appropriate  - Keep care items and personal belongings within reach  - Initiate and maintain comfort rounds  - Make Fall Risk Sign visible to staff  - Apply yellow socks and bracelet for high fall risk patients  - Consider moving patient to room near nurses station  Outcome: Progressing     Problem: BIRTH - VAGINAL/ SECTION  Goal: Fetal and maternal status remain reassuring during the birth process  Description: INTERVENTIONS:  - Monitor vital signs  - Monitor fetal heart rate  - Monitor uterine activity  - Monitor labor progression (vaginal delivery)  - DVT prophylaxis  - Antibiotic prophylaxis  Outcome: Progressing  Goal: Emotionally satisfying birthing experience for mother/fetus  Description: Interventions:  - Assess, plan, implement and evaluate the nursing care given to the patient in labor  - Advocate the philosophy that each childbirth experience is a unique experience and support the family's chosen level of involvement and control during the labor process   - Actively participate in both the patient's and family's  teaching of the birth process  - Consider cultural, Worship and age-specific factors and plan care for the patient in labor  Outcome: Progressing

## 2024-06-23 NOTE — ASSESSMENT & PLAN NOTE
Systolic (24hrs), Av , Min:106 , Max:145   Diastolic (24hrs), Av, Min:70, Max:94    Isolated SRBP on presentation secondary to anxiety, subsequent blood pressures elevated in triage < 4 hrs apart  No prior documented elevated BP this pregnancy.  CBC, CMP wnl, PC ratio 0.23  Currently asymptomatic.

## 2024-06-23 NOTE — PROGRESS NOTES
Patient comfortable in tub, FHT's auscultated intermittently by doppler in the 140's, contractions irregular & occurring every 5-8 minutes, membranes remain intact.

## 2024-06-23 NOTE — OB LABOR/OXYTOCIN SAFETY PROGRESS
Oxytocin Safety Progress Check Note - Misty Cutler 33 y.o. female MRN: [id number]    Unit/Bed#: -01 Encounter: [id number]      Contraction Frequency (minutes): 2-4  Contraction Intensity: Moderate/Strong  Uterine Activity Characteristics: Regular  Cervical Dilation: 5-6        Cervical Effacement: 70  Fetal Station: 0  Baseline Rate (FHR): 120 bpm  Fetal Heart Rate (FHT): 130 BPM  FHR Category: 1               Vital Signs:   Vitals:    06/23/24 1719   BP: 139/82   Pulse: 85   Resp:    Temp:    SpO2:        Notes/comments:    Patient having intense anxiety about not being able to feel legs with epidural. She has asked that it be turned off. SVE is unchanged. Pitocin currently at 6mU, continue titration.     Freddy Joseph MD 6/23/2024 5:34 PM

## 2024-06-23 NOTE — H&P
H & P- Obstetrics   Misty Cutler 33 y.o. female MRN: [id number]  Unit/Bed#:  304-01 Encounter: [id number]    Assessment: 33 y.o.  at 40w1d admitted for elevated BP @ term.  SVE: /-3  FHT: Cat 1  Clinical EFW: 50.8% ; Cephalic confirmed by TAUS, BESSY 10.59 cm  GBS status: negative     Plan:   Elevated blood pressure affecting pregnancy in third trimester, antepartum  Assessment & Plan  Systolic (24hrs), Av , Min:129 , Max:164   Diastolic (24hrs), Av, Min:83, Max:108    Isolated SRBP on presentation secondary to anxiety, subsequent blood pressures elevated in triage < 4 hrs apart  No prior documented elevated BP this pregnancy.  CBC, CMP wnl, PC ratio 0.23  Currently asymptomatic.      Family history of VSD (ventricular septal defect)  Assessment & Plan  Son w/ VSD  Fetal echo wnl @ 23wk    History of  delivery, antepartum  Assessment & Plan  Hx 1LTCS for breech presentatin, PPROM @ 35wk    Rh negative status during pregnancy  Assessment & Plan  Rhogam panel PP    * 40 weeks gestation of pregnancy  Assessment & Plan  Admit to OBGYN for IOL for elevated BP at term, TOLAC  Patient amenable to induction of labor.  Plan for starting induction with Keita balloon placement.  Patient would prefer to start Pitocin after Keita balloon has been expelled.  Clear liquid diet   F/u T&S, CBC, RPR   IVF LR 125cc/hr   Continuous fetal monitoring and tocometry   Analgesia at maternal request   Vertex by TAUS, BESSY 10.5cm, membranes in tact, negative ROM workup  Induction plan: keita balloon followed by pitocin titration             Discussed case and plan w/ Dr. Lomeli      Chief Complaint:  contractions    HPI: Misty Cutler is a 33 y.o.  with an ALEXSANDRA of 2024, by Last Menstrual Period at 40w1d who is being admitted for IOL for elevated BP at term. She complains of uterine contractions, occurring every 3-5 minutes, has scant LOF, and reports no VB. She states she has  felt good FM.    She had negative ROM workup with normal BESSY. SVE unchange from office exam.  Newly elevated BP < 4 hours apart. Pt was scheduled for post dates IOL on 24    Patient Active Problem List   Diagnosis    Celiac disease    Rh negative status during pregnancy    History of  delivery, antepartum    History of  delivery, currently pregnant    Family history of VSD (ventricular septal defect)    Family history of protein C deficiency    Elevated blood pressure affecting pregnancy in third trimester, antepartum    40 weeks gestation of pregnancy       Baby complications/comments:   Fetal ECHO wnl    Review of Systems    OB Hx:  OB History    Para Term  AB Living   2 1   1   1   SAB IAB Ectopic Multiple Live Births         0 1      # Outcome Date GA Lbr Mitch/2nd Weight Sex Type Anes PTL Lv   2 Current            1  22 35w4d  2510 g (5 lb 8.5 oz) M CS-LTranv Spinal Y MARCY       Past Medical Hx:  Past Medical History:   Diagnosis Date    Celiac disease     Depression        Past Surgical hx:  Past Surgical History:   Procedure Laterality Date    EGD      KNEE SURGERY      [place]  DELIVERY ONLY N/A 2022    Procedure:  SECTION ();  Surgeon: Kelly Lomeli DO;  Location: [facility];  Service: Obstetrics       Social Hx:  Alcohol use: denies  Tobacco use: denies  Other substance use: denies    Allergies   Allergen Reactions    Gluten Meal - Food Allergy      Other reaction(s): GI issues    Penicillins Hives         Medications Prior to Admission:     Acetaminophen (TYLENOL PO)    magnesium 30 MG tablet    Prenatal w/o A Vit-Fe Fum-FA (PRENATAL-H PO)    Objective:  Temp:  [97.8 °F (36.6 °C)] 97.8 °F (36.6 °C)  HR:  [64-77] 68  Resp:  [18] 18  BP: (129-164)/() 132/84  Body mass index is 29.18 kg/m².     Physical Exam:  Physical Exam  Constitutional:       General: She is not in acute distress.     Appearance: Normal appearance. She is not  ill-appearing or toxic-appearing.   Genitourinary:      Vulva normal.      Genitourinary Comments: Speculum exam revealed cervical mucus, no gross pooling noted, nitrazine negative, no ferning on microscopy  SVE: 1/60/-3      Vaginal discharge present.   HENT:      Head: Normocephalic and atraumatic.      Right Ear: External ear normal.      Left Ear: External ear normal.      Nose: Nose normal.      Mouth/Throat:      Mouth: Mucous membranes are moist.   Eyes:      General: Lids are normal.      Extraocular Movements: Extraocular movements intact.      Conjunctiva/sclera: Conjunctivae normal.   Cardiovascular:      Rate and Rhythm: Normal rate and regular rhythm.      Pulses: Normal pulses.      Heart sounds: Normal heart sounds. No murmur heard.  Musculoskeletal:      Cervical back: Full passive range of motion without pain and normal range of motion.      Right lower leg: No edema.      Left lower leg: No edema.      Right foot: Normal range of motion.      Left foot: Normal range of motion.   Neurological:      General: No focal deficit present.      Mental Status: She is alert.      Motor: No weakness.   Skin:     General: Skin is warm and dry.   Psychiatric:         Mood and Affect: Mood normal.         Judgment: Judgment normal.   Vitals reviewed. Exam conducted with a chaperone present.            FHT:   Baseline Rate (FHR): 130 bpm  Variability: Moderate  Accelerations: 15 x 15 or greater  Decelerations: None  FHR Category: Category I    TOCO:   Contraction Frequency (minutes): 1-6  Contraction Duration (seconds): 30-60  Contraction Intensity: Mild    Lab Results   Component Value Date    WBC 10.39 (H) 06/22/2024    HGB 13.5 06/22/2024    HCT 38.6 06/22/2024     06/22/2024     Lab Results   Component Value Date    K 3.7 06/22/2024     06/22/2024    CO2 21 06/22/2024    BUN 11 06/22/2024    CREATININE 0.59 (L) 06/22/2024    AST 14 06/22/2024    ALT 9 06/22/2024     Prenatal Labs: Reviewed      Blood type: B negative  Antibody Screen: negative  GBS negative  HIV: non-reactive  Rubella: immune  Syphilis IgM/IgG: non-reactive  HBsAg: non-reactive  HCAb: non-reactive  Chlamydia: negative  Gonorrhea: negative  Diabetes 1 hour screen: Normal  Hgb 13.0 as of 4/2/24  Platelets 258k as of 4/2/24  >2 Midnights  INPATIENT     Signature/Title: Gm Hannon,   Date: 6/23/2024  Time: 3:45 AM

## 2024-06-23 NOTE — PROGRESS NOTES
Spoke with patient, FODENISSE, and anna marie at bedside due to concerns for patient's high level of anxiety.  Patient was having pain and requested epidural placement, however the feeling of numbness in her legs made her anxious she therefore requested the epidural to be turned off.  This was done and subsequently shortly after patient began to feel pain again.  Is anxious both about her pain and the feeling in her legs.  Patient says that she is feeling overwhelmed by all of this and is unsure if she will be able to continue the labor process.  She very much does not want a  but is concerned that that may be her only option due to how she is feeling.  She is asking for medication to help with her anxiety and potentially to help her sleep.  Discussed with Dr. Parsons who is amenable to offering the patient Ambien and Ativan.  As well as Stadol and morphine for pain.  FOB is asking if there is any reason she cannot simply go home at this time.  Explained that as patient is 40 weeks 1 day gestation, 5 and half centimeters dilated, and meeting criteria for gestational hypertension, we would not want to discharge her at this time.  Should she want to leave she it would have to be AGAINST MEDICAL ADVICE.   Patient is asking if it is possible to turn off the Pitocin when she receives the medications for anxiety and sleep as she does not think she will be able to sleep with the pain.  Will discuss with Dr. Parsons.     Humaira Huitron MD  OBGYN PGY-2  2024 7:18 PM

## 2024-06-23 NOTE — ANESTHESIA PROCEDURE NOTES
Epidural Block    Patient location during procedure: OB/L&D  Start time: 6/23/2024 3:24 PM  Reason for block: primary anesthetic  Staffing  Performed by: Dinesh Sams DO  Authorized by: Dinesh Sams DO    Preanesthetic Checklist  Completed: patient identified, IV checked, site marked, risks and benefits discussed, surgical consent, monitors and equipment checked, pre-op evaluation and timeout performed  Epidural  Patient position: sitting  Prep: Betadine  Sedation Level: no sedation  Patient monitoring: heart rate and frequent blood pressure checks  Approach: midline  Location: lumbar, L3-4  Injection technique: BEVERLY air  Needle  Needle type: Tuohy   Needle gauge: 18 G  Needle insertion depth: 4 cm  Catheter type: closed tip, multi-orifice and side hole  Catheter size: 20 G  Catheter at skin depth: 8 cm  Catheter securement method: clear occlusive dressing, stabilization device and tape  Test dose: negative  Assessment  Number of attempts: 1negative aspiration for CSF, negative aspiration for heme and no paresthesia on injection  patient tolerated the procedure well with no immediate complications

## 2024-06-23 NOTE — PROGRESS NOTES
Patient states that she does not know how much longer she can tolerate labor for as she is feeling very overwhelmed. Dr. Huitron aware.

## 2024-06-23 NOTE — PROGRESS NOTES
Patient requesting to use tub; permission given by doctor for patient to use tub at this time. Membranes remain intact, cervical keita balloon still in place so Dr. Joseph will not perform SVE until keita balloon is out. FHR tracing was category 1 for at least 30 minutes prior to patient entering tub.Tub is filled with clean/clear water & water temperature is 97.4 degrees fahrenheit. Patient in tub at 09:25, accompanied by staff & patients' . Oral hydration (water) provided to patient.

## 2024-06-24 PROBLEM — O13.9 GESTATIONAL HYPERTENSION: Status: ACTIVE | Noted: 2024-06-23

## 2024-06-24 LAB
ABO GROUP BLD: NORMAL
BASE EXCESS BLDCOA CALC-SCNC: -5.9 MMOL/L (ref 3–11)
BASE EXCESS BLDCOV CALC-SCNC: -5.2 MMOL/L (ref 1–9)
BLD GP AB SCN SERPL QL: NEGATIVE
FETAL CELL SCN BLD QL ROSETTE: NEGATIVE
HCO3 BLDCOA-SCNC: 21.4 MMOL/L (ref 17.3–27.3)
HCO3 BLDCOV-SCNC: 18.4 MMOL/L (ref 12.2–28.6)
O2 CT VFR BLDCOA CALC: 11 ML/DL
OXYHGB MFR BLDCOA: 47.1 %
OXYHGB MFR BLDCOV: 78.7 %
PCO2 BLDCOA: 48.4 MM[HG] (ref 30–60)
PCO2 BLDCOV: 31.1 MM HG (ref 27–43)
PH BLDCOA: 7.26 [PH] (ref 7.23–7.43)
PH BLDCOV: 7.39 [PH] (ref 7.19–7.49)
PO2 BLDCOA: 22.7 MM HG (ref 5–25)
PO2 BLDCOV: 33.8 MM HG (ref 15–45)
RH BLD: NEGATIVE
SAO2 % BLDCOV: 17.7 ML/DL
TREPONEMA PALLIDUM IGG+IGM AB [PRESENCE] IN SERUM OR PLASMA BY IMMUNOASSAY: NORMAL

## 2024-06-24 PROCEDURE — 85461 HEMOGLOBIN FETAL: CPT

## 2024-06-24 PROCEDURE — NC001 PR NO CHARGE: Performed by: OBSTETRICS & GYNECOLOGY

## 2024-06-24 PROCEDURE — 86901 BLOOD TYPING SEROLOGIC RH(D): CPT

## 2024-06-24 PROCEDURE — 86850 RBC ANTIBODY SCREEN: CPT

## 2024-06-24 PROCEDURE — 0KQM0ZZ REPAIR PERINEUM MUSCLE, OPEN APPROACH: ICD-10-PCS | Performed by: OBSTETRICS & GYNECOLOGY

## 2024-06-24 PROCEDURE — 86900 BLOOD TYPING SEROLOGIC ABO: CPT

## 2024-06-24 PROCEDURE — 3E0334Z INTRODUCTION OF SERUM, TOXOID AND VACCINE INTO PERIPHERAL VEIN, PERCUTANEOUS APPROACH: ICD-10-PCS | Performed by: OBSTETRICS & GYNECOLOGY

## 2024-06-24 PROCEDURE — 82805 BLOOD GASES W/O2 SATURATION: CPT | Performed by: OBSTETRICS & GYNECOLOGY

## 2024-06-24 RX ORDER — DOCUSATE SODIUM 100 MG/1
100 CAPSULE, LIQUID FILLED ORAL 2 TIMES DAILY
Status: DISCONTINUED | OUTPATIENT
Start: 2024-06-24 | End: 2024-06-25 | Stop reason: HOSPADM

## 2024-06-24 RX ORDER — ACETAMINOPHEN 325 MG/1
650 TABLET ORAL EVERY 4 HOURS PRN
Status: DISCONTINUED | OUTPATIENT
Start: 2024-06-24 | End: 2024-06-25 | Stop reason: HOSPADM

## 2024-06-24 RX ORDER — CALCIUM CARBONATE 500 MG/1
1000 TABLET, CHEWABLE ORAL DAILY PRN
Status: DISCONTINUED | OUTPATIENT
Start: 2024-06-24 | End: 2024-06-25 | Stop reason: HOSPADM

## 2024-06-24 RX ORDER — OXYTOCIN/RINGER'S LACTATE 30/500 ML
250 PLASTIC BAG, INJECTION (ML) INTRAVENOUS ONCE
Status: COMPLETED | OUTPATIENT
Start: 2024-06-24 | End: 2024-06-25

## 2024-06-24 RX ORDER — BENZOCAINE/MENTHOL 6 MG-10 MG
1 LOZENGE MUCOUS MEMBRANE DAILY PRN
Status: DISCONTINUED | OUTPATIENT
Start: 2024-06-24 | End: 2024-06-25 | Stop reason: HOSPADM

## 2024-06-24 RX ORDER — ONDANSETRON 2 MG/ML
4 INJECTION INTRAMUSCULAR; INTRAVENOUS EVERY 8 HOURS PRN
Status: DISCONTINUED | OUTPATIENT
Start: 2024-06-24 | End: 2024-06-25 | Stop reason: HOSPADM

## 2024-06-24 RX ORDER — IBUPROFEN 600 MG/1
600 TABLET ORAL EVERY 6 HOURS
Status: DISCONTINUED | OUTPATIENT
Start: 2024-06-24 | End: 2024-06-25 | Stop reason: HOSPADM

## 2024-06-24 RX ORDER — DIPHENHYDRAMINE HCL 25 MG
25 TABLET ORAL EVERY 6 HOURS PRN
Status: DISCONTINUED | OUTPATIENT
Start: 2024-06-24 | End: 2024-06-25 | Stop reason: HOSPADM

## 2024-06-24 RX ADMIN — IBUPROFEN 600 MG: 600 TABLET, FILM COATED ORAL at 03:50

## 2024-06-24 RX ADMIN — Medication 250 MILLI-UNITS/MIN: at 03:51

## 2024-06-24 RX ADMIN — RHO(D) IMMUNE GLOBULIN (HUMAN) 300 MCG: 1500 SOLUTION INTRAMUSCULAR at 18:31

## 2024-06-24 RX ADMIN — ACETAMINOPHEN 650 MG: 325 TABLET, FILM COATED ORAL at 19:54

## 2024-06-24 RX ADMIN — IBUPROFEN 600 MG: 600 TABLET, FILM COATED ORAL at 14:53

## 2024-06-24 RX ADMIN — IBUPROFEN 600 MG: 600 TABLET, FILM COATED ORAL at 21:04

## 2024-06-24 RX ADMIN — DOCUSATE SODIUM 100 MG: 100 CAPSULE, LIQUID FILLED ORAL at 18:32

## 2024-06-24 RX ADMIN — DOCUSATE SODIUM 100 MG: 100 CAPSULE, LIQUID FILLED ORAL at 08:48

## 2024-06-24 RX ADMIN — IBUPROFEN 600 MG: 600 TABLET, FILM COATED ORAL at 08:48

## 2024-06-24 NOTE — PLAN OF CARE
Problem: Knowledge Deficit  Goal: Verbalizes understanding of labor plan  Description: Assess patient/family/caregiver's baseline knowledge level and ability to understand information.  Provide education via patient/family/caregiver's preferred learning method at appropriate level of understanding.     1. Provide teaching at level of understanding.  2. Provide teaching via preferred learning method(s).  6/24/2024 0312 by Karla Andre  Outcome: Completed  6/23/2024 1907 by Karla Andre  Outcome: Progressing     Problem: Labor & Delivery  Goal: Manages discomfort  Description: Assess and monitor for signs and symptoms of discomfort.  Assess patient's pain level regularly and per hospital policy.  Administer medications as ordered. Support use of nonpharmacological methods to help control pain such as distraction, imagery, relaxation, and application of heat and cold.  Collaborate with interdisciplinary team and patient to determine appropriate pain management plan.    1. Include patient in decisions related to comfort.  2. Offer non-pharmacological pain management interventions.  3. Report ineffective pain management to physician.  6/24/2024 0312 by Karla Andre  Outcome: Completed  6/23/2024 1907 by Karla Andre  Outcome: Progressing  Goal: Patient vital signs are stable  Description: 1. Assess vital signs - vaginal delivery.  6/24/2024 0312 by Karla Andre  Outcome: Completed  6/23/2024 1907 by Karla Andre  Outcome: Progressing     Problem: PAIN - ADULT  Goal: Verbalizes/displays adequate comfort level or baseline comfort level  Description: Interventions:  - Encourage patient to monitor pain and request assistance  - Assess pain using appropriate pain scale  - Administer analgesics based on type and severity of pain and evaluate response  - Implement non-pharmacological measures as appropriate and evaluate response  - Consider cultural and social influences on pain and pain  management  - Notify physician/advanced practitioner if interventions unsuccessful or patient reports new pain  2024 by Karla Andre  Outcome: Completed  2024 by Karla Andre  Outcome: Progressing     Problem: INFECTION - ADULT  Goal: Absence or prevention of progression during hospitalization  Description: INTERVENTIONS:  - Assess and monitor for signs and symptoms of infection  - Monitor lab/diagnostic results  - Monitor all insertion sites, i.e. indwelling lines, tubes, and drains  - Monitor endotracheal if appropriate and nasal secretions for changes in amount and color  - [place] appropriate cooling/warming therapies per order  - Administer medications as ordered  - Instruct and encourage patient and family to use good hand hygiene technique  - Identify and instruct in appropriate isolation precautions for identified infection/condition  2024 by Karla Andre  Outcome: Completed  2024 by Karla Andre  Outcome: Progressing     Problem: SAFETY ADULT  Goal: Patient will remain free of falls  Description: INTERVENTIONS:  - Educate patient/family on patient safety including physical limitations  - Instruct patient to call for assistance with activity   - Consult OT/PT to assist with strengthening/mobility   - Keep Call bell within reach  - Keep bed low and locked with side rails adjusted as appropriate  - Keep care items and personal belongings within reach  - Initiate and maintain comfort rounds  - Make Fall Risk Sign visible to staff  - Apply yellow socks and bracelet for high fall risk patients  - Consider moving patient to room near nurses station  2024 by Karla Andre  Outcome: Completed  2024 by Karla Andre  Outcome: Progressing     Problem: BIRTH - VAGINAL/ SECTION  Goal: Fetal and maternal status remain reassuring during the birth process  Description: INTERVENTIONS:  - Monitor vital signs  - Monitor fetal  heart rate  - Monitor uterine activity  - Monitor labor progression (vaginal delivery)  - DVT prophylaxis  - Antibiotic prophylaxis  6/24/2024 0312 by Karla Andre  Outcome: Completed  6/23/2024 1907 by Karla Andre  Outcome: Progressing  Goal: Emotionally satisfying birthing experience for mother/fetus  Description: Interventions:  - Assess, plan, implement and evaluate the nursing care given to the patient in labor  - Advocate the philosophy that each childbirth experience is a unique experience and support the family's chosen level of involvement and control during the labor process   - Actively participate in both the patient's and family's teaching of the birth process  - Consider cultural, [employer] and age-specific factors and plan care for the patient in labor  6/24/2024 0312 by Karla Andre  Outcome: Completed  6/23/2024 1907 by Karla Andre  Outcome: Progressing

## 2024-06-24 NOTE — DISCHARGE SUMMARY
Discharge Summary - Misty Cutler 33 y.o. female MRN: [id number]    Unit/Bed#: -01 Encounter: [id number]    Admission Date: 2024     Discharge Date: ***    Patient Active Problem List   Diagnosis    Celiac disease    Rh negative status during pregnancy    History of  delivery, antepartum    History of  delivery, currently pregnant    Family history of VSD (ventricular septal defect)    Family history of protein C deficiency    Gestational hypertension     (spontaneous vaginal delivery)       OBGYN Practice: [facility]    Hospital Course:   Misty Cutler is a 33 y.o.  who was admitted at 40w2d for for induction of labor in the setting of newly diagnosed gestational hypertension.  Patient initially presented to labor and delivery due to concern for possible rupture and contractions.  Rupture was ruled out and she was /-3 on SVE.  Eclampsia labs were collected and were found to be within normal limits.  She had a Palma balloon placed for cervical ripening.  She was started on a Pitocin titration.  She had an epidural placed for analgesia.  Throughout her labor course patient did have a large amount of anxiety over her pain, numbness in her legs, and her exhaustion.  She therefore received Atarax followed by Ativan and Ambien.  She had spontaneous rupture of membranes for clear fluid.. ***      Delivery Findings:  Misty delivered a viable female  on 2024 3:02 AM via Vaginal, Spontaneous . The delivery was uncomplicated    Baby's Weight:  ;      Apgar scores: 8  and 9  at 1 and 5 minutes, respectively  Anesthesia: Epidural,   QBL:           was transferred to *** nursery. Patient tolerated the procedure well and was transferred to recovery in stable condition.     Her post-partum course was uncomplicated***.  Her post-partum pain was well controlled with oral analgesics. On day of discharge she was ambulating,  "voiding spontaneously, tolerating oral intake and hemodynamically stable. Mom's blood type is {[facility] with [facility]:36917} and  Rhogam {WAS/WAS NOT:[id number]::\"was not\"} given.    She was discharged home on postpartum day #*** without complications. Patient was instructed to follow up with her OB as an outpatient and was given appropriate warnings to call doctor or provider if she develops signs of infection or uncontrolled pain.    Discharge Problem List by Issue:   Gestational hypertension  Assessment & Plan  Systolic (24hrs), Av , Min:129 , Max:164   Diastolic (24hrs), Av, Min:83, Max:108    Isolated SRBP on presentation secondary to anxiety, subsequent blood pressures elevated in triage < 4 hrs apart  No prior documented elevated BP this pregnancy.  CBC, CMP wnl, PC ratio 0.23  Currently asymptomatic.      Family history of VSD (ventricular septal defect)  Assessment & Plan  Son w/ VSD  Fetal echo wnl @ 23wk    History of  delivery, antepartum  Assessment & Plan  Hx 1LTCS for breech presentatin, PPROM @ 35wk    Rh negative status during pregnancy  Assessment & Plan  Rhogam panel PP    *  (spontaneous vaginal delivery)  Assessment & Plan  Admit to OBGYN for IOL for elevated BP at term, TOLAC  Patient amenable to induction of labor.  Plan for starting induction with Keita balloon placement.  Patient would prefer to start Pitocin after Keita balloon has been expelled.  Clear liquid diet   F/u T&S, CBC, RPR   IVF LR 125cc/hr   Continuous fetal monitoring and tocometry   Analgesia at maternal request   Vertex by TAUS, BESSY 10.5cm, membranes in tact, negative ROM workup  Induction plan: keita balloon followed by pitocin titration            Disposition: Home    Planned Readmission: No    Discharge Medications:   Please see AVS    Discharge instructions :   -Do not place anything (no partner, tampons or douche) in your vagina for 6 weeks.  -You may walk for exercise for the first 6 weeks then gradually return " to your usual activities.   -Please do not drive for 1 week if you have no stitches and for 2 weeks if you have stitches or underwent a  delivery.    -You may take baths or shower per your preference.   -Please look at your bust (breasts) in the mirror daily and call your doctor for redness or tenderness or increased warmth.   - If you have had a  section please look at your incision daily as well and call provider for increasing redness or steady drainage from the incision.   -Please call your doctor's office if temperature > 100.4*F or 38* C, worsening pain or a foul discharge.    Follow Up:  - Follow up in *** weeks for postpartum visit    ***

## 2024-06-24 NOTE — L&D DELIVERY NOTE
DELIVERY NOTE  Misty Cutler 33 y.o. female MRN: [id number]  Unit/Bed#: -01 Encounter: [id number]    Obstetrician:    Dr. Kelly Lomeli DO    Assistant:   Dr. Humaira Huitron MD    Pre-Delivery Diagnosis:   Patient Active Problem List   Diagnosis    Celiac disease    Rh negative status during pregnancy    History of  delivery, antepartum    History of  delivery, currently pregnant    Family history of VSD (ventricular septal defect)    Family history of protein C deficiency    Gestational hypertension    40 weeks gestation of pregnancy          Post-Delivery Diagnosis:   Same as above - Delivered  Viable female fetus  2nd degree laceration      Procedure:  Successful   Repair of second degree laceration     Anesthesia:  epidural    Specimens:   Cord blood obtained   Placenta; normal appearing, central insertion, intact   Arterial and venous blood gases (below)     Gases:  Umbilical Cord Venous Blood Gas:  Results from last 7 days   Lab Units 24  0303   PH COV  7.389   PCO2 COV mm HG 31.1   HCO3 COV mmol/L 18.4   BASE EXC COV mmol/L -5.2*   O2 CT CD VB mL/dL 17.7   O2 HGB, VENOUS CORD % 78.7     Umbilical Cord Arterial Blood Gas:  Results from last 7 days   Lab Units 24  0303   PH COA  7.263   PCO2 COA  48.4   PO2 COA mm HG 22.7   HCO3 COA mmol/L 21.4   BASE EXC COA mmol/L -5.9*   O2 CONTENT CORD ART ml/dl 11.0   O2 HGB, ARTERIAL CORD % 47.1       Quantitative Blood Loss:   484 mL           Complications:    None    Brief Description of Labor Course:  Misty Cutler is a 33 y.o.  female admitted to L&D at 40w1d for induction of labor in the setting of newly diagnosed gestational hypertension.  Patient initially presented to labor and delivery due to concern for possible rupture and contractions.  Rupture was ruled out and she was /-3 on SVE.  Eclampsia labs were collected and were found to be within normal limits.  She had a Palma balloon  placed for cervical ripening.  She was started on a Pitocin titration.  She had an epidural placed for analgesia.  Throughout her labor course patient did have a large amount of anxiety over her pain, numbness in her legs, and her exhaustion.  She therefore received Atarax followed by Ativan and Ambien.  She had spontaneous rupture of membranes for clear fluid.  She progressed to complete at 0105, pushed for 1 hour 57 min, and delivered a healthy  at 0302.     Description of Delivery:   With  the assistance of maternal expulsive forces, the fetal vertex delivered spontaneously. A nuchal cord was noted and reduced. The anterior  right shoulder was delivered atraumatically with gentle downward traction. The contralateral arm was delivered with gentle upward traction resulting in a viable female .     Upon delivery, the infant was placed on the mothers abdomen and the cord was doubly clamped and cut after 30 seconds. The  was noted to have good tone and cry spontaneously. . There was no evidence of injury.  The  was passed off to  staff for evaluation. Umbilical cord blood and umbilical artery and venous gases were collected and sent to the lab. An intact placenta was delivered spontaneously at 0307 using fundal massage and gentle cord traction and was noted to have a centrally-inserted 3-vessel cord. Active management of the third stage of labor was undertaken with IV pitocin at 250 milliunits/min.    Bleeding was noted to be brisk. A bimanual exam was performed for evacuation of a moderate amount of blood. Uterus was noted to have poor tone, Pitocin was opened wide with appropriate response in uterine tone.        Outcome:  Living  with APGARS 8 (1 min) and 9 (5 min).    weight: pending    Perineal Inspection/Laceration Repair  Inspection of the perineum, vagina, labia, cervix, and urethra revealed a 2nd degree laceration, which was repaired in standard  fashion with 2-0 Vicryl rapide. Patient was comfortable with epidural at that time. The laceration showed good tissue reapproximation and hemostasis.    At the conclusion of the delivery, all needle, sponge, and instrument counts were noted to be correct. Patient tolerated the procedure well and was allowed to recover in labor and delivery room with family and  before being transferred to the post-partum floor.     Conclusion:  Mother and baby are currently recovering nicely in stable condition.    Attending Supervision:   Dr. Kelly Lomeli DO was present for the entire procedure.    Humaira Huitron MD  OB/GYN PGY-2   2024 3:33 AM

## 2024-06-24 NOTE — PLAN OF CARE
Problem: PAIN - ADULT  Goal: Verbalizes/displays adequate comfort level or baseline comfort level  Description: Interventions:  - Encourage patient to monitor pain and request assistance  - Assess pain using appropriate pain scale  - Administer analgesics based on type and severity of pain and evaluate response  - Implement non-pharmacological measures as appropriate and evaluate response  - Consider cultural and social influences on pain and pain management  - Notify physician/advanced practitioner if interventions unsuccessful or patient reports new pain  Outcome: Progressing     Problem: POSTPARTUM  Goal: Experiences normal postpartum course  Description: INTERVENTIONS:  - Monitor maternal vital signs  - Assess uterine involution and lochia  Outcome: Progressing  Goal: Appropriate maternal -  bonding  Description: INTERVENTIONS:  - Identify family support  - Assess for appropriate maternal/infant bonding   -Encourage maternal/infant bonding opportunities  - Referral to  or  as needed  Outcome: Progressing  Goal: Establishment of infant feeding pattern  Description: INTERVENTIONS:  - Assess breast/bottle feeding  - Refer to lactation as needed  Outcome: Progressing  Goal: Incision(s), wounds(s) or drain site(s) healing without S/S of infection  Description: INTERVENTIONS  - Assess and document dressing, incision, wound bed, drain sites and surrounding tissue  - Provide patient and family education  Outcome: Progressing     Problem: INFECTION - ADULT  Goal: Absence or prevention of progression during hospitalization  Description: INTERVENTIONS:  - Assess and monitor for signs and symptoms of infection  - Monitor lab/diagnostic results  - Monitor all insertion sites, i.e. indwelling lines, tubes, and drains  - Monitor endotracheal if appropriate and nasal secretions for changes in amount and color  - [place] appropriate cooling/warming therapies per order  - Administer medications  as ordered  - Instruct and encourage patient and family to use good hand hygiene technique  - Identify and instruct in appropriate isolation precautions for identified infection/condition  Outcome: Progressing  Goal: Absence of fever/infection during neutropenic period  Description: INTERVENTIONS:  - Monitor WBC    Outcome: Progressing     Problem: SAFETY ADULT  Goal: Patient will remain free of falls  Description: INTERVENTIONS:  - Educate patient/family on patient safety including physical limitations  - Instruct patient to call for assistance with activity   - Consult OT/PT to assist with strengthening/mobility   - Keep Call bell within reach  - Keep bed low and locked with side rails adjusted as appropriate  - Keep care items and personal belongings within reach  - Initiate and maintain comfort rounds  - Make Fall Risk Sign visible to staff  - Apply yellow socks and bracelet for high fall risk patients  - Consider moving patient to room near nurses station  Outcome: Progressing  Goal: Maintain or return to baseline ADL function  Description: INTERVENTIONS:  -  Assess patient's ability to carry out ADLs; assess patient's baseline for ADL function and identify physical deficits which impact ability to perform ADLs (bathing, care of mouth/teeth, toileting, grooming, dressing, etc.)  - Assess/evaluate cause of self-care deficits   - Assess range of motion  - Assess patient's mobility; develop plan if impaired  - Assess patient's need for assistive devices and provide as appropriate  - Encourage maximum independence but intervene and supervise when necessary  - Involve family in performance of ADLs  - Assess for home care needs following discharge   - Consider OT consult to assist with ADL evaluation and planning for discharge  - Provide patient education as appropriate  Outcome: Progressing  Goal: Maintains/Returns to pre admission functional level  Description: INTERVENTIONS:  - Perform AM-PAC 6 Click Basic  Mobility/ Daily Activity assessment daily.  - Set and communicate daily mobility goal to care team and patient/family/caregiver.   - Collaborate with rehabilitation services on mobility goals if consulted  - Out of bed for toileting  - Record patient progress and toleration of activity level   Outcome: Progressing     Problem: DISCHARGE PLANNING  Goal: Discharge to home or other facility with appropriate resources  Description: INTERVENTIONS:  - Identify barriers to discharge w/patient and caregiver  - Arrange for needed discharge resources and transportation as appropriate  - Identify discharge learning needs (meds, wound care, etc.)  - Arrange for interpretive services to assist at discharge as needed  - Refer to Case Management Department for coordinating discharge planning if the patient needs post-hospital services based on physician/advanced practitioner order or complex needs related to functional status, cognitive ability, or social support system  Outcome: Progressing     Problem: Knowledge Deficit  Goal: Patient/family/caregiver demonstrates understanding of disease process, treatment plan, medications, and discharge instructions  Description: Complete learning assessment and assess knowledge base.  Interventions:  - Provide teaching at level of understanding  - Provide teaching via preferred learning methods  Outcome: Progressing

## 2024-06-24 NOTE — PROGRESS NOTES
Pt refusing to have her blood pressure checked at this time. Educated on need to monitor vital signs. Will reapply cuff as soon as pt allows.

## 2024-06-24 NOTE — OB LABOR/OXYTOCIN SAFETY PROGRESS
Oxytocin Safety Progress Check Note - Misty Cutler 33 y.o. female MRN: [id number]    Unit/Bed#: -01 Encounter: [id number]    Dose (giuliana-units/min) Oxytocin: 8 giuliana-units/min  Contraction Frequency (minutes): 2.5-5  Contraction Intensity: Moderate/Strong  Uterine Activity Characteristics: Irregular  Cervical Dilation: Lip/rim (Comment)        Cervical Effacement: 100  Fetal Station: 1  Baseline Rate (FHR): 155 bpm  Fetal Heart Rate (FHT): 130 BPM (baby off monitor until 1951 d/t maternal movement)  FHR Category: 1               Vital Signs:   Vitals:    06/23/24 2319   BP: 131/70   Pulse: 77   Resp:    Temp:    SpO2:        Notes/comments:   SVE as above.   Contiune pitocin titration.   Patient requesting another dose of Ativan, will discuss with Dr. Jaqueline Huitron MD 6/24/2024 12:14 AM

## 2024-06-24 NOTE — PLAN OF CARE
Problem: PAIN - ADULT  Goal: Verbalizes/displays adequate comfort level or baseline comfort level  Description: Interventions:  - Encourage patient to monitor pain and request assistance  - Assess pain using appropriate pain scale  - Administer analgesics based on type and severity of pain and evaluate response  - Implement non-pharmacological measures as appropriate and evaluate response  - Consider cultural and social influences on pain and pain management  - Notify physician/advanced practitioner if interventions unsuccessful or patient reports new pain  Outcome: Progressing     Problem: POSTPARTUM  Goal: Experiences normal postpartum course  Description: INTERVENTIONS:  - Monitor maternal vital signs  - Assess uterine involution and lochia  Outcome: Progressing  Goal: Appropriate maternal -  bonding  Description: INTERVENTIONS:  - Identify family support  - Assess for appropriate maternal/infant bonding   -Encourage maternal/infant bonding opportunities  - Referral to  or  as needed  Outcome: Progressing  Goal: Establishment of infant feeding pattern  Description: INTERVENTIONS:  - Assess breast/bottle feeding  - Refer to lactation as needed  Outcome: Progressing  Goal: Incision(s), wounds(s) or drain site(s) healing without S/S of infection  Description: INTERVENTIONS  - Assess and document dressing, incision, wound bed, drain sites and surrounding tissue  - Provide patient and family education  - Perform skin care/dressing changes every   Outcome: Progressing     Problem: INFECTION - ADULT  Goal: Absence or prevention of progression during hospitalization  Description: INTERVENTIONS:  - Assess and monitor for signs and symptoms of infection  - Monitor lab/diagnostic results  - Monitor all insertion sites, i.e. indwelling lines, tubes, and drains  - Monitor endotracheal if appropriate and nasal secretions for changes in amount and color  - [place] appropriate cooling/warming  therapies per order  - Administer medications as ordered  - Instruct and encourage patient and family to use good hand hygiene technique  - Identify and instruct in appropriate isolation precautions for identified infection/condition  Outcome: Progressing  Goal: Absence of fever/infection during neutropenic period  Description: INTERVENTIONS:  - Monitor WBC    Outcome: Progressing     Problem: SAFETY ADULT  Goal: Patient will remain free of falls  Description: INTERVENTIONS:  - Educate patient/family on patient safety including physical limitations  - Instruct patient to call for assistance with activity   - Consult OT/PT to assist with strengthening/mobility   - Keep Call bell within reach  - Keep bed low and locked with side rails adjusted as appropriate  - Keep care items and personal belongings within reach  - Initiate and maintain comfort rounds  - Make Fall Risk Sign visible to staff  - Offer Toileting every  Hours, in advance of need  - Initiate/Maintain alarm  - Obtain necessary fall risk management equipment:   - Apply yellow socks and bracelet for high fall risk patients  - Consider moving patient to room near nurses station  Outcome: Progressing  Goal: Maintain or return to baseline ADL function  Description: INTERVENTIONS:  -  Assess patient's ability to carry out ADLs; assess patient's baseline for ADL function and identify physical deficits which impact ability to perform ADLs (bathing, care of mouth/teeth, toileting, grooming, dressing, etc.)  - Assess/evaluate cause of self-care deficits   - Assess range of motion  - Assess patient's mobility; develop plan if impaired  - Assess patient's need for assistive devices and provide as appropriate  - Encourage maximum independence but intervene and supervise when necessary  - Involve family in performance of ADLs  - Assess for home care needs following discharge   - Consider OT consult to assist with ADL evaluation and planning for discharge  - Provide  patient education as appropriate  Outcome: Progressing  Goal: Maintains/Returns to pre admission functional level  Description: INTERVENTIONS:  - Perform AM-PAC 6 Click Basic Mobility/ Daily Activity assessment daily.  - Set and communicate daily mobility goal to care team and patient/family/caregiver.   - Collaborate with rehabilitation services on mobility goals if consulted  - Perform Range of Motion  times a day.  - Reposition patient every  hours.  - Dangle patient  times a day  - Stand patient  times a day  - Ambulate patient  times a day  - Out of bed to chair  times a day   - Out of bed for meals  times a day  - Out of bed for toileting  - Record patient progress and toleration of activity level   Outcome: Progressing     Problem: DISCHARGE PLANNING  Goal: Discharge to home or other facility with appropriate resources  Description: INTERVENTIONS:  - Identify barriers to discharge w/patient and caregiver  - Arrange for needed discharge resources and transportation as appropriate  - Identify discharge learning needs (meds, wound care, etc.)  - Arrange for interpretive services to assist at discharge as needed  - Refer to Case Management Department for coordinating discharge planning if the patient needs post-hospital services based on physician/advanced practitioner order or complex needs related to functional status, cognitive ability, or social support system  Outcome: Progressing     Problem: Knowledge Deficit  Goal: Patient/family/caregiver demonstrates understanding of disease process, treatment plan, medications, and discharge instructions  Description: Complete learning assessment and assess knowledge base.  Interventions:  - Provide teaching at level of understanding  - Provide teaching via preferred learning methods  Outcome: Progressing

## 2024-06-24 NOTE — PROGRESS NOTES
With patient again at bedside as she was sobbing.   Patient is in increasing pain.  Had received the Ativan but not the Ambien at this time.  Ambien pending from pharmacy.  Met with Karla Ander RN, Dr. Sams and Dr. Lomeli via phone outside of patient room.   Concern voiced for patient's ability to tolerate regional anesthesia vs her need for pain relief.   However, patient also wants to avoid another .   Discussed concern for the role of exhaustion. Will give Ambien with the hope that Misty will be able to rest.   Dr. Sams to restart epidural to see if pain relief will help aid in this effort.   Pitocin held for now. Will plan to restart vs AROM when patient is more comfortable/ tolerating the process better.       Humaira Huitron MD  OBGYN PGY-2  2024 8:35 PM

## 2024-06-24 NOTE — PROGRESS NOTES
Met with Dr. Huitron, Dr. Sams, and Dr. Salazar to discuss plan to manage pain and anxiety. Will give ambien when approved by pharmacy and restart epidural to relieve pain. Will restart pitocin once epidural has relieved pts pain.

## 2024-06-24 NOTE — ANESTHESIA POSTPROCEDURE EVALUATION
"Post-Op Assessment Note    CV Status:  Stable    Pain management: adequate      Post-op block assessment: catheter intact and no complications   Mental Status:  Alert and awake   Hydration Status:  Euvolemic   PONV Controlled:  Controlled   Airway Patency:  Patent     Post Op Vitals Reviewed: Yes    No anethesia notable event occurred.    Staff: Anesthesiologist               BP      Temp      Pulse     Resp      SpO2      /65   Pulse 80   Temp 98.7 °F (37.1 °C) (Oral)   Resp 20   Ht 5' 4\" (1.626 m)   Wt 77.1 kg (170 lb)   LMP 09/16/2023   SpO2 99%   Breastfeeding Yes   BMI 29.18 kg/m²     "

## 2024-06-24 NOTE — LACTATION NOTE
This note was copied from a baby's chart.  CONSULT - LACTATION  Baby Girl Cutler (Kathleen) 0 days female MRN: [id number]    [facility] NURSERY Room / Bed:  415(N)/ 415(N) Encounter: [id number]    Maternal Information     MOTHER:  Misty Cutler  Maternal Age: 33 y.o.  OB History: # 1 - Date: 22, Sex: Male, Weight: 2510 g (5 lb 8.5 oz), GA: 35w4d, Type: , Low Transverse, Apgar1: 9, Apgar5: 9, Living: Living, Birth Comments: None    # 2 - Date: 24, Sex: Female, Weight: 3420 g (7 lb 8.6 oz), GA: 40w2d, Type: Vaginal, Spontaneous, Apgar1: 8, Apgar5: 9, Living: Living, Birth Comments: None   Previouse breast reduction surgery? No    Lactation history:   Has patient previously breast fed: Yes   How long had patient previously breast fed: 1 year   Previous breast feeding complications: Exclusive pump and bottle fed     Past Surgical History:   Procedure Laterality Date    EGD      KNEE SURGERY      NE  DELIVERY ONLY N/A 2022    Procedure:  SECTION ();  Surgeon: Kelly Lomeli DO;  Location: [facility];  Service: Obstetrics       Birth information:  YOB: 2025   Time of birth: 3:02 AM   Sex: female   Delivery type: Vaginal, Spontaneous   Birth Weight: 3420 g (7 lb 8.6 oz)   Percent of Weight Change: 0%     Gestational Age: 40w2d   [unfilled]    Assessment     Breast and nipple assessment: normal assessment    [place] Assessment: normal assessment    Feeding assessment: feeding well  LATCH:  Latch: Grasps breast, tongue down, lips flanged, rhythmic sucking   Audible Swallowing: Spontaneous and intermittent (24 hours old)   Type of Nipple: Everted (After stimulation)   Comfort (Breast/Nipple): Soft/non-tender   Hold (Positioning): Partial assist, teach one side, mother does other, staff holds   LATCH Score: 9           24 1521   Lactation Consultation   Reason for Consult 20;20 min   Maternal Information    Has mother  before? Yes   How long did the the mother previously breastfeed? 1 year   Previous Maternal Breastfeeding Challenges Exclusive pump and bottle fed   Infant to breast within first hour of birth? Yes   Exclusive Pump and Bottle Feed No   LATCH Documentation   Latch 2   Audible Swallowing 2   Type of Nipple 2   Comfort (Breast/Nipple) 2   Hold (Positioning) 1   LATCH Score 9   Having latch problems? No   Position(s) Used Cross Cradle   Breasts/Nipples   Left Breast Soft   Right Breast Soft   Left Nipple Everted   Right Nipple Everted   Intervention Hand expression;Breast pump   Breastfeeding Status Yes   Breastfeeding Progress Not yet established;Breastfeeding well   Other OB Lactation Tools   Feeding Devices Syringe   Breast Pump   Pump 3  (Spectra S1)   Pump Review/Education Milk storage   Patient Follow-Up   Lactation Consult Status 2   Follow-Up Type Inpatient;Call as needed   Other OB Lactation Documentation    Additional Problem Noted Mom attempting to latch baby upon arrival into room. Repositioned mom and baby in cross cradle hold on left breast. Demonstration with teach back of deep latch. Baby actively sucking with swallows. Encouraged to call for further assistance.  (RSB and DC booklets briefly reviewed.)     Feeding recommendations:  breast feed on demand  Mom attempting to latch baby upon arrival into room. Repositioned mom and baby in cross cradle hold on left breast. Demonstration with teach back of deep latch. Baby actively sucking with swallows. Mom states she has been syringe feeding colostrum to baby when unsuccessful for latching. Reviewed proper position and alignment for deep latch. Encouraged to call for further assistance.    Provided demonstration, education and support of deep latch to breast by placing the nipple to the nose, dragging down to chin to achieve a wide latch. Bring baby to the breast, not breast to baby. Move your shoulders down and away from your ears.  Look for ear, shoulder, hip alignment. Baby's upper and lower lip should be flanged on the breast.    Met with mother. Provided mother with Ready, Set, Baby booklet which contained information on:  Hand expression with access to QR codes to review hand expression.  Positioning and latch reviewed as well as showing images of other feeding positions.  Discussed the properties of a good latch in any position.   Feeding on cue and what that means for recognizing infant's hunger, s/s that baby is getting enough milk and some s/s that breastfeeding dyad may need further help  Skin to Skin contact an benefits to mom and baby  Avoidance of pacifiers for the first month discussed.   Gave information on common concerns, what to expect the first few weeks after delivery, preparing for other caregivers, and how partners can help. Resources for support also provided.    Met with mother to go over discharge breastfeeding booklet including the feeding log. Emphasized 8 or more (12) feedings in a 24 hour period, what to expect for the number of diapers per day of life and the progression of properties of the  stooling pattern.    List of reasons to call a lactation consultant.  Baby's Second day (cluster feeding)  Breast Changes  Physical Therapy  Storage and Handling of Breast milk    Booklet included Breastfeeding Resources for after discharge including access to the number for the [facility].      Angeles Mallory 2024 3:23 PM

## 2024-06-25 VITALS
HEART RATE: 75 BPM | HEIGHT: 64 IN | SYSTOLIC BLOOD PRESSURE: 125 MMHG | TEMPERATURE: 97.6 F | DIASTOLIC BLOOD PRESSURE: 93 MMHG | BODY MASS INDEX: 29.02 KG/M2 | WEIGHT: 170 LBS | RESPIRATION RATE: 18 BRPM | OXYGEN SATURATION: 98 %

## 2024-06-25 PROCEDURE — NC001 PR NO CHARGE: Performed by: OBSTETRICS & GYNECOLOGY

## 2024-06-25 RX ORDER — ACETAMINOPHEN 325 MG/1
650 TABLET ORAL EVERY 6 HOURS PRN
Start: 2024-06-25

## 2024-06-25 RX ORDER — IBUPROFEN 200 MG
600 TABLET ORAL EVERY 6 HOURS
Start: 2024-06-25

## 2024-06-25 RX ADMIN — DOCUSATE SODIUM 100 MG: 100 CAPSULE, LIQUID FILLED ORAL at 08:31

## 2024-06-25 RX ADMIN — IBUPROFEN 600 MG: 600 TABLET, FILM COATED ORAL at 09:12

## 2024-06-25 RX ADMIN — IBUPROFEN 600 MG: 600 TABLET, FILM COATED ORAL at 03:20

## 2024-06-25 NOTE — LACTATION NOTE
This note was copied from a baby's chart.     06/25/24 1119   Lactation Consultation   Reason for Consult 5 mins   Breasts/Nipples   Intervention Breast shells;Hydrogel pads   Patient Follow-Up   Lactation Consult Status 2   Follow-Up Type Inpatient;Call as needed   Other OB Lactation Documentation    Additional Problem Noted Additional interventions requested for nipple pain. Education provided with shells and hydrogel pads.     Encouraged parents to call for assistance, questions, and concerns about breastfeeding.  Extension provided.

## 2024-06-25 NOTE — PLAN OF CARE
Problem: PAIN - ADULT  Goal: Verbalizes/displays adequate comfort level or baseline comfort level  Description: Interventions:  - Encourage patient to monitor pain and request assistance  - Assess pain using appropriate pain scale  - Administer analgesics based on type and severity of pain and evaluate response  - Implement non-pharmacological measures as appropriate and evaluate response  - Consider cultural and social influences on pain and pain management  - Notify physician/advanced practitioner if interventions unsuccessful or patient reports new pain  2024 1145 by Fe Zapien RN  Outcome: Completed  2024 by Fe Zapien RN  Outcome: Adequate for Discharge     Problem: POSTPARTUM  Goal: Experiences normal postpartum course  Description: INTERVENTIONS:  - Monitor maternal vital signs  - Assess uterine involution and lochia  2024 114 by Fe Zapien RN  Outcome: Completed  2024 by Fe Zapien RN  Outcome: Adequate for Discharge  Goal: Appropriate maternal -  bonding  Description: INTERVENTIONS:  - Identify family support  - Assess for appropriate maternal/infant bonding   -Encourage maternal/infant bonding opportunities  - Referral to  or  as needed  2024 1145 by Fe Zapien RN  Outcome: Completed  2024 by Fe Zapien RN  Outcome: Adequate for Discharge  Goal: Establishment of infant feeding pattern  Description: INTERVENTIONS:  - Assess breast/bottle feeding  - Refer to lactation as needed  2024 1145 by Fe Zapien RN  Outcome: Completed  2024 by Fe Zapien RN  Outcome: Adequate for Discharge  Goal: Incision(s), wounds(s) or drain site(s) healing without S/S of infection  Description: INTERVENTIONS  - Assess and document dressing, incision, wound bed, drain sites and surrounding tissue  - Provide patient and family education  -2024 1145 by Fe Zapien RN  Outcome: Completed  2024 by Fe Zapien RN  Outcome:  Adequate for Discharge     Problem: INFECTION - ADULT  Goal: Absence or prevention of progression during hospitalization  Description: INTERVENTIONS:  - Assess and monitor for signs and symptoms of infection  - Monitor lab/diagnostic results  - Monitor all insertion sites, i.e. indwelling lines, tubes, and drains  - Monitor endotracheal if appropriate and nasal secretions for changes in amount and color  - [place] appropriate cooling/warming therapies per order  - Administer medications as ordered  - Instruct and encourage patient and family to use good hand hygiene technique  - Identify and instruct in appropriate isolation precautions for identified infection/condition  6/25/2024 1145 by Fe Zapien RN  Outcome: Completed  6/25/2024 0848 by Fe Zapien RN  Outcome: Adequate for Discharge  Goal: Absence of fever/infection during neutropenic period  Description: INTERVENTIONS:  - Monitor WBC    6/25/2024 1145 by Fe Zapien RN  Outcome: Completed  6/25/2024 0848 by eF Zapien RN  Outcome: Adequate for Discharge     Problem: SAFETY ADULT  Goal: Patient will remain free of falls  Description: INTERVENTIONS:  - Educate patient/family on patient safety including physical limitations  - Instruct patient to call for assistance with activity   - Consult OT/PT to assist with strengthening/mobility   - Keep Call bell within reach  - Keep bed low and locked with side rails adjusted as appropriate  - Keep care items and personal belongings within reach  - Initiate and maintain comfort rounds  - Make Fall Risk Sign visible to staff  - Apply yellow socks and bracelet for high fall risk patients  - Consider moving patient to room near nurses station  6/25/2024 1145 by Fe Zapien RN  Outcome: Completed  6/25/2024 0848 by Fe Zapien RN  Outcome: Adequate for Discharge  Goal: Maintain or return to baseline ADL function  Description: INTERVENTIONS:  -  Assess patient's ability to carry out ADLs; assess patient's baseline for ADL  function and identify physical deficits which impact ability to perform ADLs (bathing, care of mouth/teeth, toileting, grooming, dressing, etc.)  - Assess/evaluate cause of self-care deficits   - Assess range of motion  - Assess patient's mobility; develop plan if impaired  - Assess patient's need for assistive devices and provide as appropriate  - Encourage maximum independence but intervene and supervise when necessary  - Involve family in performance of ADLs  - Assess for home care needs following discharge   - Consider OT consult to assist with ADL evaluation and planning for discharge  - Provide patient education as appropriate  6/25/2024 1145 by Fe Zapien RN  Outcome: Completed  6/25/2024 0848 by Fe Zapien RN  Outcome: Adequate for Discharge  Goal: Maintains/Returns to pre admission functional level  Description: INTERVENTIONS:  - Perform AM-PAC 6 Click Basic Mobility/ Daily Activity assessment daily.  - Set and communicate daily mobility goal to care team and patient/family/caregiver.   - Collaborate with rehabilitation services on mobility goals if consulted  - - Out of bed for toileting  - Record patient progress and toleration of activity level   6/25/2024 1145 by Fe Zapien RN  Outcome: Completed  6/25/2024 0848 by Fe Zapien RN  Outcome: Adequate for Discharge     Problem: DISCHARGE PLANNING  Goal: Discharge to home or other facility with appropriate resources  Description: INTERVENTIONS:  - Identify barriers to discharge w/patient and caregiver  - Arrange for needed discharge resources and transportation as appropriate  - Identify discharge learning needs (meds, wound care, etc.)  - Arrange for interpretive services to assist at discharge as needed  - Refer to Case Management Department for coordinating discharge planning if the patient needs post-hospital services based on physician/advanced practitioner order or complex needs related to functional status, cognitive ability, or social support  system  6/25/2024 1145 by Fe Zapien RN  Outcome: Completed  6/25/2024 0848 by Fe Zapien RN  Outcome: Adequate for Discharge     Problem: Knowledge Deficit  Goal: Patient/family/caregiver demonstrates understanding of disease process, treatment plan, medications, and discharge instructions  Description: Complete learning assessment and assess knowledge base.  Interventions:  - Provide teaching at level of understanding  - Provide teaching via preferred learning methods  6/25/2024 1145 by Fe Zapien RN  Outcome: Completed  6/25/2024 0848 by Fe Zapien RN  Outcome: Adequate for Discharge

## 2024-06-25 NOTE — LACTATION NOTE
This note was copied from a baby's chart.  CONSULT - LACTATION  Baby Girl Cutler (Kathleen) 1 days female MRN: [id number]    [facility] NURSERY Room / Bed:  415(N)/ 415(N) Encounter: [id number]    Maternal Information     MOTHER:  Misty Cutler  Maternal Age: 33 y.o.  OB History: # 1 - Date: 22, Sex: Male, Weight: 2510 g (5 lb 8.5 oz), GA: 35w4d, Type: , Low Transverse, Apgar1: 9, Apgar5: 9, Living: Living, Birth Comments: None    # 2 - Date: 24, Sex: Female, Weight: 3420 g (7 lb 8.6 oz), GA: 40w2d, Type: Vaginal, Spontaneous, Apgar1: 8, Apgar5: 9, Living: Living, Birth Comments: None   Previouse breast reduction surgery? No    Lactation history:   Has patient previously breast fed: Yes   How long had patient previously breast fed: 1 year   Previous breast feeding complications: Exclusive pump and bottle fed     Past Surgical History:   Procedure Laterality Date    EGD      KNEE SURGERY      [place]  DELIVERY ONLY N/A 2022    Procedure:  SECTION ();  Surgeon: Kelly Lomeli DO;  [facility]: [facility];  Service: Obstetrics       Birth information:  YOB: 2025   Time of birth: 3:02 AM   Sex: female   Delivery type: Vaginal, Spontaneous   Birth Weight: 3420 g (7 lb 8.6 oz)   Percent of Weight Change: -2%     Gestational Age: 40w2d      24 0900   Lactation Consultation   Reason for Consult 10 minutes;5 mins;10 m   Breasts/Nipples   Date Pumping Initiated 24   Left Breast Soft;Filling   Right Breast Soft;Filling   Left Nipple Everted;Tender;Sore;Blisters   Right Nipple Everted;Tender;Sore;Blisters   Intervention Breast pump;Hand expression   Patient Follow-Up   Lactation Consult Status 2   Follow-Up Type Inpatient;Call as needed   Other OB Lactation Documentation    Additional Problem Noted Called to room by primary RN for maternal c/o sore, blistered nipple. Declines assistance with latching. Declines  attempt at feeding with nipple shield. H/O pumping excl. for one year. Mom verbalizes preference for this at this time with pain. Discussed her calling pediatrician for APNO cream if physician and patient mutually agree on this as option. Mom also has H/O Raynaud's which may be contributing factor. Enc. to call for assistance as needed/desired. Plan is to supplement baby with formula and pump (exclusively for now, my change plan at a later time).       Feeding recommendations:  pump every 2-3 hours and supplement with formula via bottle and nipple and as desired by Misty    Encouraged parents to call for assistance, questions, and concerns about breastfeeding.  Extension provided.      Rebekah Michaud RN 6/25/2024 9:24 AM

## 2024-06-25 NOTE — PROGRESS NOTES
Patient enrolled into home blood pressure monitoring program, cuff synced with pt. Phone and [facility] notified.

## 2024-06-25 NOTE — PLAN OF CARE
Problem: PAIN - ADULT  Goal: Verbalizes/displays adequate comfort level or baseline comfort level  Description: Interventions:  - Encourage patient to monitor pain and request assistance  - Assess pain using appropriate pain scale  - Administer analgesics based on type and severity of pain and evaluate response  - Implement non-pharmacological measures as appropriate and evaluate response  - Consider cultural and social influences on pain and pain management  - Notify physician/advanced practitioner if interventions unsuccessful or patient reports new pain  Outcome: Adequate for Discharge     Problem: POSTPARTUM  Goal: Experiences normal postpartum course  Description: INTERVENTIONS:  - Monitor maternal vital signs  - Assess uterine involution and lochia  Outcome: Adequate for Discharge  Goal: Appropriate maternal -  bonding  Description: INTERVENTIONS:  - Identify family support  - Assess for appropriate maternal/infant bonding   -Encourage maternal/infant bonding opportunities  - Referral to  or  as needed  Outcome: Adequate for Discharge  Goal: Establishment of infant feeding pattern  Description: INTERVENTIONS:  - Assess breast/bottle feeding  - Refer to lactation as needed  Outcome: Adequate for Discharge  Goal: Incision(s), wounds(s) or drain site(s) healing without S/S of infection  Description: INTERVENTIONS  - Assess and document dressing, incision, wound bed, drain sites and surrounding tissue  - Provide patient and family education  - Outcome: Adequate for Discharge     Problem: INFECTION - ADULT  Goal: Absence or prevention of progression during hospitalization  Description: INTERVENTIONS:  - Assess and monitor for signs and symptoms of infection  - Monitor lab/diagnostic results  - Monitor all insertion sites, i.e. indwelling lines, tubes, and drains  - Monitor endotracheal if appropriate and nasal secretions for changes in amount and color  - [place] appropriate  cooling/warming therapies per order  - Administer medications as ordered  - Instruct and encourage patient and family to use good hand hygiene technique  - Identify and instruct in appropriate isolation precautions for identified infection/condition  Outcome: Adequate for Discharge  Goal: Absence of fever/infection during neutropenic period  Description: INTERVENTIONS:  - Monitor WBC    Outcome: Adequate for Discharge     Problem: SAFETY ADULT  Goal: Patient will remain free of falls  Description: INTERVENTIONS:  - Educate patient/family on patient safety including physical limitations  - Instruct patient to call for assistance with activity   - Consult OT/PT to assist with strengthening/mobility   - Keep Call bell within reach  - Keep bed low and locked with side rails adjusted as appropriate  - Keep care items and personal belongings within reach  - Initiate and maintain comfort rounds  - Make Fall Risk Sign visible to staff  - Apply yellow socks and bracelet for high fall risk patients  - Consider moving patient to room near nurses station  Outcome: Adequate for Discharge  Goal: Maintain or return to baseline ADL function  Description: INTERVENTIONS:  -  Assess patient's ability to carry out ADLs; assess patient's baseline for ADL function and identify physical deficits which impact ability to perform ADLs (bathing, care of mouth/teeth, toileting, grooming, dressing, etc.)  - Assess/evaluate cause of self-care deficits   - Assess range of motion  - Assess patient's mobility; develop plan if impaired  - Assess patient's need for assistive devices and provide as appropriate  - Encourage maximum independence but intervene and supervise when necessary  - Involve family in performance of ADLs  - Assess for home care needs following discharge   - Consider OT consult to assist with ADL evaluation and planning for discharge  - Provide patient education as appropriate  Outcome: Adequate for Discharge  Goal:  Maintains/Returns to pre admission functional level  Description: INTERVENTIONS:  - Perform AM-PAC 6 Click Basic Mobility/ Daily Activity assessment daily.  - Set and communicate daily mobility goal to care team and patient/family/caregiver.   - Collaborate with rehabilitation services on mobility goals if consulted  - - Out of bed for toileting  - Record patient progress and toleration of activity level   Outcome: Adequate for Discharge     Problem: DISCHARGE PLANNING  Goal: Discharge to home or other facility with appropriate resources  Description: INTERVENTIONS:  - Identify barriers to discharge w/patient and caregiver  - Arrange for needed discharge resources and transportation as appropriate  - Identify discharge learning needs (meds, wound care, etc.)  - Arrange for interpretive services to assist at discharge as needed  - Refer to Case Management Department for coordinating discharge planning if the patient needs post-hospital services based on physician/advanced practitioner order or complex needs related to functional status, cognitive ability, or social support system  Outcome: Adequate for Discharge     Problem: Knowledge Deficit  Goal: Patient/family/caregiver demonstrates understanding of disease process, treatment plan, medications, and discharge instructions  Description: Complete learning assessment and assess knowledge base.  Interventions:  - Provide teaching at level of understanding  - Provide teaching via preferred learning methods  Outcome: Adequate for Discharge

## 2024-06-25 NOTE — PLAN OF CARE
Problem: PAIN - ADULT  Goal: Verbalizes/displays adequate comfort level or baseline comfort level  Description: Interventions:  - Encourage patient to monitor pain and request assistance  - Assess pain using appropriate pain scale  - Administer analgesics based on type and severity of pain and evaluate response  - Implement non-pharmacological measures as appropriate and evaluate response  - Consider cultural and social influences on pain and pain management  - Notify physician/advanced practitioner if interventions unsuccessful or patient reports new pain  Outcome: Progressing     Problem: POSTPARTUM  Goal: Experiences normal postpartum course  Description: INTERVENTIONS:  - Monitor maternal vital signs  - Assess uterine involution and lochia  Outcome: Progressing  Goal: Appropriate maternal -  bonding  Description: INTERVENTIONS:  - Identify family support  - Assess for appropriate maternal/infant bonding   -Encourage maternal/infant bonding opportunities  - Referral to  or  as needed  Outcome: Progressing  Goal: Establishment of infant feeding pattern  Description: INTERVENTIONS:  - Assess breast/bottle feeding  - Refer to lactation as needed  Outcome: Progressing  Goal: Incision(s), wounds(s) or drain site(s) healing without S/S of infection  Description: INTERVENTIONS  - Assess and document dressing, incision, wound bed, drain sites and surrounding tissue  - Provide patient and family education  Outcome: Progressing     Problem: INFECTION - ADULT  Goal: Absence or prevention of progression during hospitalization  Description: INTERVENTIONS:  - Assess and monitor for signs and symptoms of infection  - Monitor lab/diagnostic results  - Monitor all insertion sites, i.e. indwelling lines, tubes, and drains  - Monitor endotracheal if appropriate and nasal secretions for changes in amount and color  - Blythe appropriate cooling/warming therapies per order  - Administer medications  as ordered  - Instruct and encourage patient and family to use good hand hygiene technique  - Identify and instruct in appropriate isolation precautions for identified infection/condition  Outcome: Progressing  Goal: Absence of fever/infection during neutropenic period  Description: INTERVENTIONS:  - Monitor WBC    Outcome: Progressing     Problem: SAFETY ADULT  Goal: Patient will remain free of falls  Description: INTERVENTIONS:  - Educate patient/family on patient safety including physical limitations  - Instruct patient to call for assistance with activity   - Consult OT/PT to assist with strengthening/mobility   - Keep Call bell within reach  - Keep bed low and locked with side rails adjusted as appropriate  - Keep care items and personal belongings within reach  - Initiate and maintain comfort rounds  - Make Fall Risk Sign visible to staff  - Apply yellow socks and bracelet for high fall risk patients  - Consider moving patient to room near nurses station  Outcome: Progressing  Goal: Maintain or return to baseline ADL function  Description: INTERVENTIONS:  -  Assess patient's ability to carry out ADLs; assess patient's baseline for ADL function and identify physical deficits which impact ability to perform ADLs (bathing, care of mouth/teeth, toileting, grooming, dressing, etc.)  - Assess/evaluate cause of self-care deficits   - Assess range of motion  - Assess patient's mobility; develop plan if impaired  - Assess patient's need for assistive devices and provide as appropriate  - Encourage maximum independence but intervene and supervise when necessary  - Involve family in performance of ADLs  - Assess for home care needs following discharge   - Consider OT consult to assist with ADL evaluation and planning for discharge  - Provide patient education as appropriate  Outcome: Progressing  Goal: Maintains/Returns to pre admission functional level  Description: INTERVENTIONS:  - Perform AM-PAC 6 Click Basic  Mobility/ Daily Activity assessment daily.  - Set and communicate daily mobility goal to care team and patient/family/caregiver.   - Collaborate with rehabilitation services on mobility goals if consulted  - Out of bed for toileting  - Record patient progress and toleration of activity level   Outcome: Progressing     Problem: DISCHARGE PLANNING  Goal: Discharge to home or other facility with appropriate resources  Description: INTERVENTIONS:  - Identify barriers to discharge w/patient and caregiver  - Arrange for needed discharge resources and transportation as appropriate  - Identify discharge learning needs (meds, wound care, etc.)  - Arrange for interpretive services to assist at discharge as needed  - Refer to Case Management Department for coordinating discharge planning if the patient needs post-hospital services based on physician/advanced practitioner order or complex needs related to functional status, cognitive ability, or social support system  Outcome: Progressing     Problem: Knowledge Deficit  Goal: Patient/family/caregiver demonstrates understanding of disease process, treatment plan, medications, and discharge instructions  Description: Complete learning assessment and assess knowledge base.  Interventions:  - Provide teaching at level of understanding  - Provide teaching via preferred learning methods  Outcome: Progressing

## 2024-06-25 NOTE — PROGRESS NOTES
Progress Note - OB/GYN   Misty Cutler 33 y.o. female MRN: [id number]  Unit/Bed#: -01 Encounter: [id number]      Assessment/Plan    Misty Cutler is a 33 y.o.  who is PPD 1 s/p  at 40w2d     Gestational hypertension  Assessment & Plan  Systolic (24hrs), Av , Min:106 , Max:145   Diastolic (24hrs), Av, Min:70, Max:94    Isolated SRBP on presentation secondary to anxiety, subsequent blood pressures elevated in triage < 4 hrs apart  No prior documented elevated BP this pregnancy.  CBC, CMP wnl, PC ratio 0.23  Currently asymptomatic.      Family history of VSD (ventricular septal defect)  Assessment & Plan  Son w/ VSD  Fetal echo wnl @ 23wk    History of  delivery, antepartum  Assessment & Plan  Hx 1LTCS for breech presentatin, PPROM @ 35wk    Rh negative status during pregnancy  Assessment & Plan  Rhogam panel PP    *  (spontaneous vaginal delivery)  Assessment & Plan  Routine postpartum care  Encourage ambulation  Encourage familial bonding  Lactation support as needed  Pain: Motrin/Tylenol around the clock  Declines contraception             Disposition: Anticipate discharge home postpartum Day #1  Barriers to discharge: blood pressures      Subjective/Objective     Subjective: Postpartum state    Pain: no  Tolerating PO: yes  Voiding: yes  Flatus: yes  BM: no  Ambulating: yes  Breastfeeding: Breastfeeding  Chest pain: no  Shortness of breath: no  Leg pain: no  Lochia: minimal    Objective:     Vitals:  Vitals:    24 1500 24 1900 24 2300 24 0300   BP: 145/94 136/88 106/70 135/88   BP Location:  Left arm Left arm Left arm   Pulse: 72 87 72 77   Resp: 18 18 18 18   Temp: 97.9 °F (36.6 °C) 97.7 °F (36.5 °C) 97.9 °F (36.6 °C) 97.5 °F (36.4 °C)   TempSrc:  Temporal Temporal Temporal   SpO2:  97% 100% 97%   Weight:       Height:           Physical Exam:   GEN: appears well, alert and oriented x 3, pleasant and cooperative   CV: Regular rate and  rhythm  RESP: non labored breathing, lungs clear to auscultation  ABDOMEN: soft, no tenderness, no distention, Uterine fundus firm and non-tender, at the umbilicus  EXTREMITIES: non-tender  NEURO Alert and oriented to person, place, and time.       Lab Results   Component Value Date    WBC 10.39 (H) 06/22/2024    HGB 13.5 06/22/2024    HCT 38.6 06/22/2024    MCV 93 06/22/2024     06/22/2024         Shayla Peña MD  Obstetrics & Gynecology  06/25/24

## 2024-06-25 NOTE — PROGRESS NOTES
Discharge education provided by RN. Educated on reasons to call 911 vs. Call the physician, safe sleep precautions, and shaken baby syndrome. Patient asked appropriate questions and verbalized understanding of teaching.

## 2024-06-25 NOTE — DISCHARGE SUMMARY
Discharge Summary - Misty Cutler 33 y.o. female MRN: [id number]    Unit/Bed#: -01 Encounter: [id number]    Admission Date: 2024     Discharge Date: 2024    Patient Active Problem List   Diagnosis    Celiac disease    Rh negative status during pregnancy    History of  delivery, antepartum    History of  delivery, currently pregnant    Family history of VSD (ventricular septal defect)    Family history of protein C deficiency    Gestational hypertension     (spontaneous vaginal delivery)   And     OBGYN Practice: [facility]    Hospital Course:   Misty Cutler is a 33 y.o.  who was admitted at 40w2d for for induction of labor in the setting of newly diagnosed gestational hypertension.  Patient initially presented to labor and delivery due to concern for possible rupture and contractions.  Rupture was ruled out and she was /-3 on SVE.  Eclampsia labs were collected and were found to be within normal limits.  She had a Palma balloon placed for cervical ripening.  She was started on a Pitocin titration.  She had an epidural placed for analgesia.  Throughout her labor course patient did have a large amount of anxiety over her pain, numbness in her legs, and her exhaustion.  She therefore received Atarax followed by Ativan and Ambien.  She had spontaneous rupture of membranes for clear fluid. She met criteria for gestational hypertension intrapartum.       Delivery Findings:  Misty delivered a viable female  on 2024 3:02 AM via Vaginal, Spontaneous (). The delivery was uncomplicated    Baby's Weight: 3420 g (7 lb 8.6 oz); 120.64    Apgar scores: 8  and 9  at 1 and 5 minutes, respectively  Anesthesia: Epidural,   QBL: Non-Surgical QBL (mL): 484         was transferred to  nursery. Patient tolerated the procedure well and was transferred to recovery in stable condition.     Her post-partum course was  uncomplicated.  Her post-partum pain was well controlled with oral analgesics. On day of discharge she was ambulating, voiding spontaneously, tolerating oral intake and hemodynamically stable. Mom's blood type is B negative and  Rhogam was given on  .    She was discharged home on postpartum day #1 without complications. Patient was instructed to follow up with her OB as an outpatient and was given appropriate warnings to call doctor or provider if she develops signs of infection or uncontrolled pain.  She was also enrolled in the OBPPBP.    Discharge Problem List by Issue:   Gestational hypertension  Assessment & Plan  Systolic (24hrs), Av , Min:106 , Max:145   Diastolic (24hrs), Av, Min:70, Max:94    Isolated SRBP on presentation secondary to anxiety, subsequent blood pressures elevated in triage < 4 hrs apart  No prior documented elevated BP this pregnancy.  CBC, CMP wnl, PC ratio 0.23  Currently asymptomatic.      Family history of VSD (ventricular septal defect)  Assessment & Plan  Son w/ VSD  Fetal echo wnl @ 23wk    History of  delivery, antepartum  Assessment & Plan  Hx 1LTCS for breech presentatin, PPROM @ 35wk    Rh negative status during pregnancy  Assessment & Plan  Rhogam panel PP    *  (spontaneous vaginal delivery)  Assessment & Plan  Routine postpartum care  Encourage ambulation  Encourage familial bonding  Lactation support as needed  Pain: Motrin/Tylenol around the clock  Declines contraception           Disposition: Home    Planned Readmission: No    Discharge Medications:   Please see AVS    Discharge instructions :   -Do not place anything (no partner, tampons or douche) in your vagina for 6 weeks.  -You may walk for exercise for the first 6 weeks then gradually return to your usual activities.   -Please do not drive for 1 week if you have no stitches and for 2 weeks if you have stitches or underwent a  delivery.    -You may take baths or shower per your  preference.   -Please look at your bust (breasts) in the mirror daily and call your doctor for redness or tenderness or increased warmth.   - If you have had a  section please look at your incision daily as well and call provider for increasing redness or steady drainage from the incision.   -Please call your doctor's office if temperature > 100.4*F or 38* C, worsening pain or a foul discharge.    Follow Up:  - Follow up in 1-2 weeks for BP check and 4 weeks for postpartum visit    Kelly Lomeli  24  10:29 AM

## 2024-06-27 ENCOUNTER — TELEPHONE (OUTPATIENT)
Dept: PERINATAL CARE | Facility: OTHER | Age: 34
End: 2024-06-27

## 2024-06-27 ENCOUNTER — HOSPITAL ENCOUNTER (OUTPATIENT)
Facility: HOSPITAL | Age: 34
Setting detail: OBSERVATION
LOS: 1 days | Discharge: HOME | End: 2024-06-28
Attending: EMERGENCY MEDICINE | Admitting: OBSTETRICS & GYNECOLOGY
Payer: COMMERCIAL

## 2024-06-27 DIAGNOSIS — O14.90 PREECLAMPSIA: ICD-10-CM

## 2024-06-27 LAB
ALBUMIN SERPL BCG-MCNC: 3.5 G/DL (ref 3.5–5)
ALP SERPL-CCNC: 163 U/L (ref 34–104)
ALT SERPL W P-5'-P-CCNC: 20 U/L (ref 7–52)
ANION GAP SERPL CALCULATED.3IONS-SCNC: 9 MMOL/L (ref 4–13)
APTT PPP: 24 SECONDS (ref 23–37)
AST SERPL W P-5'-P-CCNC: 23 U/L (ref 13–39)
BACTERIA UR QL AUTO: ABNORMAL /HPF
BILIRUB SERPL-MCNC: 0.3 MG/DL (ref 0.2–1)
BILIRUB UR QL STRIP: NEGATIVE
BLD SMEAR INTERP: NORMAL
BUN SERPL-MCNC: 15 MG/DL (ref 5–25)
CALCIUM SERPL-MCNC: 9.4 MG/DL (ref 8.4–10.2)
CHLORIDE SERPL-SCNC: 105 MMOL/L (ref 96–108)
CLARITY UR: ABNORMAL
CO2 SERPL-SCNC: 24 MMOL/L (ref 21–32)
COLOR UR: COLORLESS
CREAT SERPL-MCNC: 0.76 MG/DL (ref 0.6–1.3)
CREAT UR-MCNC: 28.6 MG/DL
ERYTHROCYTE [DISTWIDTH] IN BLOOD BY AUTOMATED COUNT: 12.1 % (ref 11.6–15.1)
GFR SERPL CREATININE-BSD FRML MDRD: 102 ML/MIN/1.73SQ M
GLUCOSE SERPL-MCNC: 84 MG/DL (ref 65–140)
GLUCOSE UR STRIP-MCNC: NEGATIVE MG/DL
HCT VFR BLD AUTO: 28.7 % (ref 34.8–46.1)
HGB BLD-MCNC: 9.9 G/DL (ref 11.5–15.4)
HGB UR QL STRIP.AUTO: ABNORMAL
INR PPP: 0.87 (ref 0.84–1.19)
KETONES UR STRIP-MCNC: NEGATIVE MG/DL
LDH SERPL-CCNC: 168 U/L (ref 140–271)
LEUKOCYTE ESTERASE UR QL STRIP: ABNORMAL
MCH RBC QN AUTO: 32.6 PG (ref 26.8–34.3)
MCHC RBC AUTO-ENTMCNC: 34.5 G/DL (ref 31.4–37.4)
MCV RBC AUTO: 94 FL (ref 82–98)
NITRITE UR QL STRIP: NEGATIVE
NON-SQ EPI CELLS URNS QL MICRO: ABNORMAL /HPF
PH UR STRIP.AUTO: 7.5 [PH]
PLATELET # BLD AUTO: 223 THOUSANDS/UL (ref 149–390)
PMV BLD AUTO: 9.6 FL (ref 8.9–12.7)
POTASSIUM SERPL-SCNC: 3.8 MMOL/L (ref 3.5–5.3)
PROT SERPL-MCNC: 6.8 G/DL (ref 6.4–8.4)
PROT UR STRIP-MCNC: NEGATIVE MG/DL
PROT UR-MCNC: 12 MG/DL
PROT/CREAT UR: 0.42 MG/G{CREAT} (ref 0–0.1)
PROTHROMBIN TIME: 12.1 SECONDS (ref 11.6–14.5)
RBC # BLD AUTO: 3.04 MILLION/UL (ref 3.81–5.12)
RBC #/AREA URNS AUTO: ABNORMAL /HPF
SODIUM SERPL-SCNC: 138 MMOL/L (ref 135–147)
SP GR UR STRIP.AUTO: 1.01 (ref 1–1.03)
URATE SERPL-MCNC: 4.8 MG/DL (ref 2–7.5)
UROBILINOGEN UR STRIP-ACNC: <2 MG/DL
WBC # BLD AUTO: 10.16 THOUSAND/UL (ref 4.31–10.16)
WBC #/AREA URNS AUTO: ABNORMAL /HPF
WBC CLUMPS # UR AUTO: PRESENT /UL

## 2024-06-27 PROCEDURE — 87186 SC STD MICRODIL/AGAR DIL: CPT

## 2024-06-27 PROCEDURE — 84550 ASSAY OF BLOOD/URIC ACID: CPT

## 2024-06-27 PROCEDURE — 82570 ASSAY OF URINE CREATININE: CPT

## 2024-06-27 PROCEDURE — 83615 LACTATE (LD) (LDH) ENZYME: CPT

## 2024-06-27 PROCEDURE — 87077 CULTURE AEROBIC IDENTIFY: CPT

## 2024-06-27 PROCEDURE — 99283 EMERGENCY DEPT VISIT LOW MDM: CPT

## 2024-06-27 PROCEDURE — 87086 URINE CULTURE/COLONY COUNT: CPT

## 2024-06-27 PROCEDURE — 87147 CULTURE TYPE IMMUNOLOGIC: CPT

## 2024-06-27 PROCEDURE — NC001 PR NO CHARGE: Performed by: OBSTETRICS & GYNECOLOGY

## 2024-06-27 PROCEDURE — 85027 COMPLETE CBC AUTOMATED: CPT

## 2024-06-27 PROCEDURE — 85610 PROTHROMBIN TIME: CPT

## 2024-06-27 PROCEDURE — 36415 COLL VENOUS BLD VENIPUNCTURE: CPT

## 2024-06-27 PROCEDURE — 85730 THROMBOPLASTIN TIME PARTIAL: CPT

## 2024-06-27 PROCEDURE — 99285 EMERGENCY DEPT VISIT HI MDM: CPT | Performed by: EMERGENCY MEDICINE

## 2024-06-27 PROCEDURE — 84156 ASSAY OF PROTEIN URINE: CPT

## 2024-06-27 PROCEDURE — 81001 URINALYSIS AUTO W/SCOPE: CPT

## 2024-06-27 PROCEDURE — 80053 COMPREHEN METABOLIC PANEL: CPT

## 2024-06-27 RX ORDER — NIFEDIPINE 30 MG/1
30 TABLET, EXTENDED RELEASE ORAL DAILY
Status: DISCONTINUED | OUTPATIENT
Start: 2024-06-27 | End: 2024-06-28 | Stop reason: HOSPADM

## 2024-06-27 RX ORDER — IBUPROFEN 600 MG/1
600 TABLET ORAL EVERY 6 HOURS PRN
Status: DISCONTINUED | OUTPATIENT
Start: 2024-06-27 | End: 2024-06-28 | Stop reason: HOSPADM

## 2024-06-27 RX ORDER — ACETAMINOPHEN 325 MG/1
975 TABLET ORAL EVERY 6 HOURS PRN
Status: DISCONTINUED | OUTPATIENT
Start: 2024-06-27 | End: 2024-06-28 | Stop reason: HOSPADM

## 2024-06-27 RX ADMIN — IBUPROFEN 600 MG: 600 TABLET, FILM COATED ORAL at 20:42

## 2024-06-27 RX ADMIN — BENZOCAINE AND LEVOMENTHOL 1 APPLICATION: 200; 5 SPRAY TOPICAL at 20:42

## 2024-06-27 RX ADMIN — NIFEDIPINE 30 MG: 30 TABLET, EXTENDED RELEASE ORAL at 22:10

## 2024-06-27 NOTE — ED ATTENDING ATTESTATION
6/27/2024  IMauricio MD, saw and evaluated the patient. I have discussed the patient with the resident/non-physician practitioner and agree with the resident's/non-physician practitioner's findings, Plan of Care, and MDM as documented in the resident's/non-physician practitioner's note, except where noted. All available labs and Radiology studies were reviewed.  I was present for key portions of any procedure(s) performed by the resident/non-physician practitioner and I was immediately available to provide assistance.       At this point I agree with the current assessment done in the Emergency Department.  I have conducted an independent evaluation of this patient a history and physical is as follows:  Patient is a 34 yo female, hx of Gestational HTN, 3 day post partum, BP higher today afternoon, mild swelling of legs, tingling in hands, no HA, blurred vision, CP, Dyspnea. On exam, Vitals noted for /91 on arrival, PERRL, no CN or focal neuro deficits, Lungs CTA, CVS RRR, Abd soft, NTND, mild swelling  of legs. D/D: Pre-eclampsia, HTN, we will check labs, give mag, contact OBGYN for recommendations.    ED Course     Patient transferred to L&D as per OB/GYN recommendations.    Critical Care Time  Procedures

## 2024-06-27 NOTE — TELEPHONE ENCOUNTER
Blood pressure monitoring program.  Misty had a medium alert blood pressure reading at 0817 this morning of 145/101.  She denies headaches, visual changes or abdominal pain.  She is not on any antihypertensive medications.  Message sent to the her OB and alert cleared.

## 2024-06-27 NOTE — ED PROVIDER NOTES
EXAMINATION TYPE: XR chest 1V portable

 

DATE OF EXAM: 3/12/2021

 

CLINICAL HISTORY: Difficulty breathing progress study.  

 

TECHNIQUE: Single AP portable semiupright view of the chest is obtained.

 

COMPARISON: Chest x-ray from one day earlier and older studies

 

FINDINGS:  Stable endotracheal and orogastric tubes. Stable left internal jugular central venous cath
eter. 

 

Persistent cardiomegaly with atherosclerotic thoracic aorta. Background reticular interstitial change
s bilaterally with persistent lower lung opacities.  Underlying scoliotic curvature or positioning.

 

IMPRESSION: Bilateral multifocal predominantly mid to lower lung acute infiltrates and/or edema on ba
ckground cardiomegaly and chronic parenchymal changes redemonstrated. No significant change from one 
day earlier. History  Chief Complaint   Patient presents with    Hypertension     Pt reports high BP reading today of 162/103. Pt reports tingling in hand and feet today. Pt diagnosed with hypertension when giving birth. Pt gave birth on . Denies headache     33-year-old  female who had uncomplicated  on  who presents due to elevated blood pressure readings.  Patient was diagnosed with gestational hypertension at the end of her pregnancy and has subsequently been monitoring her blood pressure at home.  The patient had a blood pressure this morning of 145/101.  This afternoon the patient had blood pressure readings in the 160s/100s.  The patient reports stable swelling in her bilateral ankles since giving birth.  The patient reports a tingling sensation in her bilateral hands that began today.  Patient denies headache, visual disturbances, chest pain, shortness of breath, nausea, vomiting, diarrhea, abdominal pain, dysuria.        Prior to Admission Medications   Prescriptions Last Dose Informant Patient Reported? Taking?   Acetaminophen (TYLENOL PO)   Yes No   Sig: Tylenol   Blood Pressure Monitoring (Comfort Touch BP Cuff/Medium) MISC   No No   Sig: Take BP BID per protocol.   Prenatal w/o A Vit-Fe Fum-FA (PRENATAL-H PO)   Yes No   Sig: Prenatal   acetaminophen (TYLENOL) 325 mg tablet   No No   Sig: Take 2 tablets (650 mg total) by mouth every 6 (six) hours as needed for mild pain   ibuprofen (MOTRIN) 200 mg tablet   No No   Sig: Take 3 tablets (600 mg total) by mouth every 6 (six) hours   magnesium 30 MG tablet  Self Yes No   Sig: Take 30 mg by mouth 2 (two) times a day      Facility-Administered Medications: None       Past Medical History:   Diagnosis Date    Celiac disease     Depression        Past Surgical History:   Procedure Laterality Date    EGD      KNEE SURG[facility]    Procedure:  SECTION ();  Surgeon: Kelly Lomeli DO;  Location: [facility];   Service: Obstetrics       Family History   Problem Relation Age of Onset    Celiac disease Mother     Colon cancer Maternal Grandfather     Lung cancer Maternal Grandfather      I have reviewed and agree with the history as documented.    E-Cigarette/Vaping    E-Cigarette Use Never User      E-Cigarette/Vaping Substances    Nicotine No     THC No     CBD No     Flavoring No     Other No     Unknown No      Social History     Tobacco Use    Smoking status: Never    Smokeless tobacco: Never   Vaping Use    Vaping status: Never Used   Substance Use Topics    Alcohol use: Not Currently     Comment: rarely    Drug use: Never        Review of Systems   Constitutional:  Negative for chills, diaphoresis and fever.   HENT:  Negative for congestion and sore throat.    Eyes:  Negative for pain, redness and visual disturbance.   Respiratory:  Negative for cough and shortness of breath.    Cardiovascular:  Negative for chest pain, palpitations and leg swelling.   Gastrointestinal:  Negative for abdominal pain, diarrhea, nausea and vomiting.   Genitourinary:  Negative for dysuria, flank pain and hematuria.   Musculoskeletal:  Negative for arthralgias and myalgias.   Skin:  Negative for color change, pallor and rash.   Neurological:  Negative for dizziness, tremors, seizures, syncope, facial asymmetry, speech difficulty, weakness, light-headedness, numbness and headaches.        Tingling in bilateral hands   All other systems reviewed and are negative.      Physical Exam  ED Triage Vitals   Temperature Pulse Respirations Blood Pressure SpO2   06/27/24 1810 06/27/24 1813 06/27/24 1810 06/27/24 1810 06/27/24 1813   98.2 °F (36.8 °C) 64 18 (!) 177/91 99 %      Temp Source Heart Rate Source Patient Position - Orthostatic VS BP Location FiO2 (%)   06/27/24 1810 06/27/24 1810 06/27/24 1810 06/27/24 1810 --   Oral Monitor Sitting Right arm       Pain Score       06/27/24 1815       No Pain             Orthostatic Vital Signs  Vitals:     06/27/24 1810 06/27/24 1813 06/27/24 1815 06/27/24 1830   BP: (!) 177/91  160/85 154/94   Pulse:  64 66 61   Patient Position - Orthostatic VS: Sitting  Lying Lying       Physical Exam  Vitals and nursing note reviewed.   Constitutional:       General: She is not in acute distress.     Appearance: Normal appearance. She is not ill-appearing, toxic-appearing or diaphoretic.   HENT:      Head: Normocephalic and atraumatic.      Nose: Nose normal. No congestion or rhinorrhea.      Mouth/Throat:      Mouth: Mucous membranes are moist.      Pharynx: Oropharynx is clear.   Eyes:      General: No scleral icterus.     Extraocular Movements: Extraocular movements intact.      Conjunctiva/sclera: Conjunctivae normal.      Pupils: Pupils are equal, round, and reactive to light.   Cardiovascular:      Rate and Rhythm: Normal rate and regular rhythm.      Pulses: Normal pulses.      Heart sounds: Normal heart sounds. No murmur heard.     No friction rub. No gallop.   Pulmonary:      Effort: Pulmonary effort is normal.      Breath sounds: Normal breath sounds. No wheezing, rhonchi or rales.   Abdominal:      General: Abdomen is flat.      Palpations: Abdomen is soft.      Tenderness: There is no abdominal tenderness. There is no right CVA tenderness, left CVA tenderness, guarding or rebound.   Musculoskeletal:         General: No swelling, tenderness, deformity or signs of injury. Normal range of motion.      Cervical back: Normal range of motion and neck supple. No rigidity or tenderness.      Right lower leg: No edema.      Left lower leg: No edema.   Lymphadenopathy:      Cervical: No cervical adenopathy.   Skin:     General: Skin is warm and dry.      Capillary Refill: Capillary refill takes less than 2 seconds.      Coloration: Skin is not jaundiced or pale.      Findings: No bruising, erythema, lesion or rash.   Neurological:      General: No focal deficit present.      Mental Status: She is alert and oriented to person,  place, and time.      Cranial Nerves: No cranial nerve deficit.      Sensory: No sensory deficit.      Motor: No weakness.         ED Medications  Medications - No data to display    Diagnostic Studies  Results Reviewed       Procedure Component Value Units Date/Time    CBC and Platelet [[id number]]  (Abnormal) Collected: 24    Lab Status: Final result Specimen: Blood from Arm, Right Updated: 24     WBC 10.16 Thousand/uL      RBC 3.04 Million/uL      Hemoglobin 9.9 g/dL      Hematocrit 28.7 %      MCV 94 fL      MCH 32.6 pg      MCHC 34.5 g/dL      RDW 12.1 %      Platelets 223 Thousands/uL      MPV 9.6 fL     LD [[id number]] Collected: 24    Lab Status: In process Specimen: Blood from Arm, Right Updated: 24 184    Uric acid [[id number]] Collected: 24    Lab Status: In process Specimen: Blood from Arm, Right Updated: 24 184    CMP [[id number]] Collected: 24    Lab Status: In process Specimen: Blood from Arm, Right Updated: 24 184    Hemolysis Smear [[id number]] Collected: 24    Lab Status: In process Specimen: Arm, Right Updated: 24 184    Protime-INR [[id number]] Collected: 24    Lab Status: In process Specimen: Blood from Arm, Right Updated: 24 184    APTT [[id number]] Collected: 24    Lab Status: In process Specimen: Blood from Arm, Right Updated: 24 184    Urinalysis [[id number]]     Lab Status: No result Specimen: Urine     Protein / creatinine ratio, urine [[id number]]     Lab Status: No result Specimen: Urine                    No orders to display         Procedures  Procedures      ED Course                                       Medical Decision Making  33-year-old  female who had uncomplicated  on  who presents due to elevated blood pressure readings.  Patient is hypertensive with blood pressure of 177/91.  The remainder the patient's vitals are within the normal limits.   Physical exam is unremarkable.  The patient's elevated blood pressure readings are concerning for postpartum preeclampsia.  Will order preeclampsia labs and discussed with OB.  The on-call OB resident recommends transferring the patient to L&D given her severe range blood pressures.    Amount and/or Complexity of Data Reviewed  Labs: ordered.          Disposition  Final diagnoses:   Postpartum hypertension     Time reflects when diagnosis was documented in both MDM as applicable and the Disposition within this note       Time User Action Codes Description Comment    6/27/2024  6:39 PM Dada Hawkins Add [O16.5] Postpartum hypertension           ED Disposition       ED Disposition   Send to L&D After Provider Eval    Condition   --    Date/Time   u Jun 27, 2024  6:39 PM    Comment   --             Follow-up Information    None         Patient's Medications   Discharge Prescriptions    No medications on file     No discharge procedures on file.    PDMP Review       None             ED Provider  Attending physically available and evaluated Misty Cutler. I managed the patient along with the ED Attending.    Electronically Signed by           Dada Hawkins DO  06/27/24 192

## 2024-06-28 VITALS
RESPIRATION RATE: 18 BRPM | HEART RATE: 70 BPM | HEIGHT: 64 IN | TEMPERATURE: 98.5 F | OXYGEN SATURATION: 99 % | BODY MASS INDEX: 29.18 KG/M2 | SYSTOLIC BLOOD PRESSURE: 115 MMHG | DIASTOLIC BLOOD PRESSURE: 73 MMHG

## 2024-06-28 PROCEDURE — NC001 PR NO CHARGE: Performed by: OBSTETRICS & GYNECOLOGY

## 2024-06-28 PROCEDURE — 99213 OFFICE O/P EST LOW 20 MIN: CPT

## 2024-06-28 RX ORDER — NIFEDIPINE 30 MG/1
30 TABLET, EXTENDED RELEASE ORAL DAILY
Qty: 30 TABLET | Refills: 0 | Status: SHIPPED | OUTPATIENT
Start: 2024-06-29

## 2024-06-28 RX ADMIN — NIFEDIPINE 30 MG: 30 TABLET, EXTENDED RELEASE ORAL at 08:51

## 2024-06-28 RX ADMIN — IBUPROFEN 600 MG: 600 TABLET, FILM COATED ORAL at 05:15

## 2024-06-28 RX ADMIN — ACETAMINOPHEN 975 MG: 325 TABLET, FILM COATED ORAL at 08:51

## 2024-06-28 NOTE — H&P
H & P- Obstetrics   Misty Cutler 33 y.o. female MRN: [id number]  Unit/Bed#:  311-01 Encounter: [id number]    Assessment: 33 y.o.  at 40w2d admitted for blood pressure medication titration.      Plan:    (spontaneous vaginal delivery)  Assessment & Plan  Postpartum care  Tylenol/Motrin as needed  Dermoplast/Tucks pads    Preeclampsia  Assessment & Plan  Met criteria for gestational hypertension in labor  On postpartum day 3 had several nonsustained severe range blood pressures in the ED  Upon presentation to labor and delivery, blood pressures elevated but nonsevere  Now meeting criteria for preeclampsia secondary to a protein to creatinine ratio of 0.42  CBC/CMP within normal limits  Will initiate Procardia XL 30 mg daily  Observation for blood pressure monitoring          Discussed case and plan w/ Dr. Lomeli      Chief Complaint: high blood pressures at home    HPI: Misty Cutler is a 33 y.o.  with an ALEXSANDRA of 2024, by Last Menstrual Period at 40w2d who is being admitted for postpartum preeclampsia currently without severe features.   Patient was at home and taking her blood pressure using a home cuff 1 pressure was noted to be elevated, pressures had not previously been elevated when she had been checking at home.  Upon presentation to the emergency room patient was noted to have several nonsustained severe range blood pressures not requiring acute treatment.  Upon presentation to labor and delivery patient's blood pressures were noted to be elevated but nonsevere.  Therefore the decision was made not to administer magnesium drip.  But rather, to initiate antihypertensive and observe blood pressures.    Patient Active Problem List   Diagnosis    Celiac disease    Rh negative status during pregnancy    History of  delivery, antepartum    History of  delivery, currently pregnant    Family history of VSD (ventricular septal defect)    Family history of  protein C deficiency    Preeclampsia     (spontaneous vaginal delivery)       Baby complications/comments: none    Review of Systems   Constitutional:  Negative for chills and fever.   Eyes:  Negative for visual disturbance.   Respiratory:  Negative for cough, shortness of breath and wheezing.    Cardiovascular:  Negative for chest pain and leg swelling.   Gastrointestinal:  Negative for abdominal pain, diarrhea, nausea and vomiting.   Genitourinary:  Negative for pelvic pain, vaginal bleeding and vaginal discharge.   Musculoskeletal:  Negative for back pain.   Neurological:  Negative for weakness, light-headedness and headaches.       OB Hx:  OB History    Para Term  AB Living   2 2 1 1   2   SAB IAB Ectopic Multiple Live Births         0 2      # Outcome Date GA Lbr Mitch/2nd Weight Sex Type Anes PTL Lv   2 Term 24 40w2d / 01:57 3420 g (7 lb 8.6 oz) F Vag-Spont EPI N MARCY   1  22 35w4d  2510 g (5 lb 8.5 oz) M CS-LTranv Spinal Y MARCY       Past Medical Hx:  Past Medical History:   Diagnosis Date    Celiac disease     Depression        Past Surgical hx:  Past Surgical History:   Procedure Laterality Date    EGD      KNEE SURGERY      MS  DELIVERY ONLY N/A 2022    Procedure:  SECTION ();  Surgeon: Kelly Lomeli DO;  Location: [facility];  Service: Obstetrics       Social Hx:  Alcohol use: denies  Tobacco use: denies  Other substance use: denies      Allergies   Allergen Reactions    Gluten Meal - Food Allergy      Other reaction(s): GI issues    Penicillins Hives         Medications Prior to Admission:     Acetaminophen (TYLENOL PO)    acetaminophen (TYLENOL) 325 mg tablet    Blood Pressure Monitoring (Comfort Touch BP Cuff/Medium) MISC    ibuprofen (MOTRIN) 200 mg tablet    magnesium 30 MG tablet    Prenatal w/o A Vit-Fe Fum-FA (PRENATAL-H PO)    Objective:  Temp:  [98.2 °F (36.8 °C)] 98.2 °F (36.8 °C)  HR:  [49-70] 70  Resp:  [18] 18  BP:  (122-177)/(69-97) 122/69  Body mass index is 29.18 kg/m².     Physical Exam:  Physical Exam  Constitutional:       Appearance: Normal appearance.   Cardiovascular:      Rate and Rhythm: Normal rate and regular rhythm.   Pulmonary:      Effort: Pulmonary effort is normal. No respiratory distress.   Abdominal:      Palpations: Abdomen is soft.      Tenderness: There is no abdominal tenderness.   Musculoskeletal:         General: No swelling or tenderness.   Neurological:      General: No focal deficit present.      Mental Status: She is alert and oriented to person, place, and time.   Skin:     General: Skin is warm and dry.   Vitals reviewed.               Lab Results   Component Value Date    WBC 10.16 06/27/2024    HGB 9.9 (L) 06/27/2024    HCT 28.7 (L) 06/27/2024     06/27/2024     Lab Results   Component Value Date    K 3.8 06/27/2024     06/27/2024    CO2 24 06/27/2024    BUN 15 06/27/2024    CREATININE 0.76 06/27/2024    AST 23 06/27/2024    ALT 20 06/27/2024     <2 Midnights  OBSERVATION    Signature/Title: Humaira Huitron MD  Date: 6/27/2024  Time: 10:08 PM

## 2024-06-28 NOTE — ASSESSMENT & PLAN NOTE
Met criteria for gestational hypertension in labor  On postpartum day 3 had several nonsustained severe range blood pressures in the ED  Upon presentation to labor and delivery, blood pressures elevated but nonsevere  Now meeting criteria for preeclampsia secondary to a protein to creatinine ratio of 0.42  CBC/CMP within normal limits  Will initiate Procardia XL 30 mg daily  Observation for blood pressure monitoring  Systolic (12hrs), Av , Min:110 , Max:154   Diastolic (12hrs), Av, Min:64, Max:96

## 2024-06-28 NOTE — PROGRESS NOTES
"Progress Note - OB/GYN   Misty Cutler 33 y.o. female MRN: [id number]  Unit/Bed#:  311-01 Encounter: [id number]    Assessment:  Post partum Day #4 s/p , stable, baby at home    Plan:   (spontaneous vaginal delivery)  Assessment & Plan  Postpartum care  Tylenol/Motrin as needed  Dermoplast/Tucks pads    Preeclampsia  Assessment & Plan  Met criteria for gestational hypertension in labor  On postpartum day 3 had several nonsustained severe range blood pressures in the ED  Upon presentation to labor and delivery, blood pressures elevated but nonsevere  Now meeting criteria for preeclampsia secondary to a protein to creatinine ratio of 0.42  CBC/CMP within normal limits  Will initiate Procardia XL 30 mg daily  Observation for blood pressure monitoring  Systolic (12hrs), Av , Min:110 , Max:154   Diastolic (12hrs), Av, Min:64, Max:96        Subjective/Objective   Chief Complaint:     Post delivery. Patient is doing well. Lochia WNL. Pain well controlled.     Subjective:     Pain: yes, cramping, improved with meds  Tolerating PO: yes  Voiding: yes  Flatus: yes  Ambulating: yes  Chest pain: no  Shortness of breath: no  Leg pain: no  Lochia: minimal    Objective:     Vitals: /69   Pulse 75   Temp 98.2 °F (36.8 °C) (Oral)   Resp 18   Ht 5' 4\" (1.626 m)   LMP 2023   SpO2 99%   Breastfeeding Yes   BMI 29.18 kg/m²     I/O       None            Lab Results   Component Value Date    WBC 10.16 2024    HGB 9.9 (L) 2024    HCT 28.7 (L) 2024    MCV 94 2024     2024       Physical Exam:     Gen: AAOx3, NAD  CV: no acute distress  Lungs: no acute distress  Abd: Soft, non-tender, non-distended, no rebound or guarding  Uterine fundus firm and non-tender, 1 cm below the umbilicus.  Ext: Non tender    Senait Pak MD  OBGYN PGY-4  2024  6:24 AM  "

## 2024-06-28 NOTE — UTILIZATION REVIEW
Initial Clinical Review    Admission: Date/Time/Statement:   Admission Orders (From admission, onward)       Ordered        24  Place in Observation  Once                          Orders Placed This Encounter   Procedures    Place in Observation     Standing Status:   Standing     Number of Occurrences:   1     Order Specific Question:   Level of Care     Answer:   Med Surg [16]     ED Arrival Information       Expected   -    Arrival   2024 18:04    Acuity   Emergent              Means of arrival   Walk-In    Escorted by   Family Member    Service   OB/GYN    Admission type   Emergency              Arrival complaint   High bp             Chief Complaint   Patient presents with    Hypertension     Pt reports high BP reading today of 162/103. Pt reports tingling in hand and feet today. Pt diagnosed with hypertension when giving birth. Pt gave birth on . Denies headache       Initial Presentation: 33 y.o. female HX gHTN w pregnancy  at 40w2d Observation admission due to pre eclampsia 2ndary to protein : CR ration w elevated BP but non sustained;   EXAM  3 had several nonsustained severe range blood pressures; meeting criteria for preeclampsia secondary to a protein to creatinine ratio of 0.42   Start Procardia XL 30 mg daily w  blood pressure medication titration according to serial BPs     Date:    Day 2:   PPD # 4   Observation for blood pressure monitoring  Systolic (12hrs), Av , Min:110 , Max:154   Diastolic (12hrs), Av, Min:64, Max:96     ED Triage Vitals   Temperature Pulse Respirations Blood Pressure SpO2 Pain Score   24 1810 24 1813 24 1810 24 1810 24 1813 06/27/24 1815   98.2 °F (36.8 °C) 64 18 (!) 177/91 99 % No Pain     Weight (last 2 days)       None            Vital Signs (last 3 days)       Date/Time Temp Pulse Resp BP MAP (mmHg) SpO2 O2 Device Patient Position - Orthostatic VS Ronald Coma Scale Score Pain    24 0912 -- 70 --  115/73 -- -- -- -- -- --    06/28/24 0851 -- 75 -- 138/92 -- -- -- -- -- 3    06/28/24 0822 -- 53 -- 112/71 -- -- -- -- -- --    06/28/24 0807 -- 55 -- 118/71 -- -- -- -- -- --    06/28/24 0752 98.5 °F (36.9 °C) 64 18 121/75 -- -- -- -- -- --    06/28/24 0737 -- 55 -- 122/72 -- -- -- -- -- --    06/28/24 0722 -- 56 -- 123/69 -- -- -- -- -- --    06/28/24 0707 -- 56 -- 124/75 -- -- -- -- -- --    06/28/24 0652 -- 67 -- 118/78 -- -- -- -- -- --    06/28/24 0637 -- 60 -- 134/76 -- -- -- -- -- --    06/28/24 0622 -- 64 -- 124/74 -- -- -- -- -- --    06/28/24 0612 -- -- -- 124/69 -- -- -- -- -- --    06/28/24 0607 -- 64 -- -- -- -- -- -- -- --    06/28/24 0515 -- -- -- 110/73 -- -- -- -- -- 2    06/28/24 0500 -- -- -- 115/69 -- -- -- -- -- --    06/28/24 0311 -- -- -- 123/74 -- -- -- -- -- --    06/28/24 0300 -- -- -- 124/79 -- -- -- -- -- --    06/28/24 0220 -- 75 -- 112/68 -- -- -- -- -- --    06/28/24 0200 -- -- -- 143/70 -- -- -- -- -- --    06/28/24 0100 -- -- -- 125/83 -- -- -- -- -- --    06/28/24 0047 -- -- -- 143/77 -- -- -- -- -- --    06/28/24 0029 -- -- -- 119/74 -- -- -- -- -- --    06/27/24 2337 -- 62 -- 121/64 -- -- -- -- -- --    06/27/24 2127 -- -- -- 122/69 -- -- -- -- -- --    06/27/24 2100 -- -- -- 125/70 -- -- -- -- -- --    06/27/24 2042 -- -- -- 145/80 -- -- -- -- -- 1    06/27/24 2024 -- 70 -- 154/96 -- -- -- -- -- --    06/27/24 1954 -- -- -- 153/95 -- -- -- -- -- --    06/27/24 1900 -- 49 18 148/92 116 -- -- Lying -- No Pain    06/27/24 1845 -- 66 18 166/97 126 99 % None (Room air) Lying -- No Pain    06/27/24 1830 -- 61 18 154/94 119 98 % None (Room air) Lying -- --    06/27/24 1825 -- -- -- -- -- -- -- -- 15 No Pain    06/27/24 1815 -- 66 18 160/85 115 99 % None (Room air) Lying -- No Pain    06/27/24 1813 -- 64 -- -- -- 99 % None (Room air) -- -- --    06/27/24 1810 98.2 °F (36.8 °C) -- 18 177/91 -- -- -- Sitting -- --              Pertinent Labs/Diagnostic Test Results:   Radiology:  No  "orders to display     Cardiology:  No orders to display     GI:  No orders to display           Results from last 7 days   Lab Units 06/27/24  1834 06/22/24  2301   WBC Thousand/uL 10.16 10.39*   HEMOGLOBIN g/dL 9.9* 13.5   HEMATOCRIT % 28.7* 38.6   PLATELETS Thousands/uL 223 189   TOTAL NEUT ABS Thousands/µL  --  7.26         Results from last 7 days   Lab Units 06/27/24  1834 06/22/24  2301   SODIUM mmol/L 138 134*   POTASSIUM mmol/L 3.8 3.7   CHLORIDE mmol/L 105 105   CO2 mmol/L 24 21   ANION GAP mmol/L 9 8   BUN mg/dL 15 11   CREATININE mg/dL 0.76 0.59*   EGFR ml/min/1.73sq m 102 119   CALCIUM mg/dL 9.4 9.7     Results from last 7 days   Lab Units 06/27/24 1834 06/22/24  2301   AST U/L 23 14   ALT U/L 20 9   ALK PHOS U/L 163* 246*   TOTAL PROTEIN g/dL 6.8 6.3*   ALBUMIN g/dL 3.5 3.4*   TOTAL BILIRUBIN mg/dL 0.30 0.38         Results from last 7 days   Lab Units 06/27/24 1834 06/22/24  2301   GLUCOSE RANDOM mg/dL 84 99             No results found for: \"BETA-HYDROXYBUTYRATE\"                           Results from last 7 days   Lab Units 06/27/24  1834   PROTIME seconds 12.1   INR  0.87   PTT seconds 24                                                             Results from last 7 days   Lab Units 06/27/24 1954 06/22/24  2301   CLARITY UA  Turbid  --    COLOR UA  Colorless  --    SPEC GRAV UA  1.009  --    PH UA  7.5  --    GLUCOSE UA mg/dl Negative  --    KETONES UA mg/dl Negative  --    BLOOD UA  Large*  --    PROTEIN UA mg/dl Negative  --    NITRITE UA  Negative  --    BILIRUBIN UA  Negative  --    UROBILINOGEN UA (BE) mg/dl <2.0  --    LEUKOCYTES UA  Large*  --    WBC UA /hpf Innumerable*  --    RBC UA /hpf Innumerable*  --    BACTERIA UA /hpf Occasional  --    EPITHELIAL CELLS WET PREP /hpf Occasional  --    CREATININE UR mg/dL 28.6 59.9   PROTEIN UR mg/dL 12 14   PROT/CREAT RATIO UR  0.42* 0.23*       ED Treatment-Medication Administration from 06/27/2024 1804 to 06/27/2024 1902       None      "       Past Medical History:   Diagnosis Date    Celiac disease     Depression      Present on Admission:   Preeclampsia      Admitting Diagnosis: Hypertension [I10]  Postpartum hypertension [O16.5]  Age/Sex: 33 y.o. female  Admission Orders:  Scheduled Medications:  NIFEdipine, 30 mg, Oral, Daily      Continuous IV Infusions:     PRN Meds:  acetaminophen, 975 mg, Oral, Q6H PRN  ibuprofen, 600 mg, Oral, Q6H PRN            Network Utilization Review Department  ATTENTION: Please call with any questions or concerns to [phone] and carefully listen to the prompts so that you are directed to the right person. All voicemails are confidential.   For Discharge needs, contact Care Management DC Support Team at [phone] opt. 2  Send all requests for admission clinical reviews, approved or denied determinations and any other requests to dedicated fax number below belonging to the campus where the patient is receiving treatment. List of dedicated fax numbers for the Facilities:  FACILITY NAME UR FAX NUMBER   ADMISSION DENIALS (Administrative/Medical Necessity) [phone]   DISCHARGE SUPPORT TEAM (NETWORK) [phone]   PARENT CHILD HEALTH (Maternity/NICU/Pediatrics) [phone]   [facility] [phone]   [facility] [phone]   [facility] [phone]   [facility] [phone]   [facility] [phone]   [facility] [phone]   [facility] [phone]   [facility] [phone]   [facility] [phone]   [facility] [phone]   [facility] [phone]   [facility] [phone]

## 2024-06-29 LAB — BACTERIA UR CULT: ABNORMAL

## 2024-07-02 LAB — PLACENTA IN STORAGE: NORMAL

## 2024-07-08 ENCOUNTER — TELEPHONE (OUTPATIENT)
Facility: HOSPITAL | Age: 34
End: 2024-07-08

## 2024-07-08 NOTE — TELEPHONE ENCOUNTER
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 HR does range 44 to 71. BP improved to 120's/80's. Has follow up appointment with her OBGYN. Encouraged to contact her OBGYN with any concerns.

## 2024-07-24 ENCOUNTER — TELEPHONE (OUTPATIENT)
Dept: PERINATAL CARE | Facility: OTHER | Age: 34
End: 2024-07-24

## 2024-07-24 ENCOUNTER — TELEPHONE (OUTPATIENT)
Dept: OBGYN CLINIC | Facility: MEDICAL CENTER | Age: 34
End: 2024-07-24

## 2024-07-24 NOTE — TELEPHONE ENCOUNTER
Blood pressure monitoring program.  Misty had a high alert at 1333 for a heart rate of 49.  Her blood pressure was 117/82.  She is on Procardia 30 mg daily.LM to submit another reading.  She was also advised to call her OB right away if she is having any syncope, lightheadedness, palpitations, dizziness or any other concerns.  Message sent to SOD.    1452-repeat HR 59 with BP of 107/79.

## 2024-07-24 NOTE — TELEPHONE ENCOUNTER
Patient has been noncompliant in BP monitoring program.  Has not submitted blood pressure readings since 7/21/24.  Called to remind patient to submit readings.  Spoke to patient.  Reviewed instructions for submitting readings. Verbalized understanding and will submit BP readings.  States she submitted a BP yesterday, but will submit today.

## 2024-07-31 ENCOUNTER — TELEPHONE (OUTPATIENT)
Dept: OBGYN CLINIC | Facility: MEDICAL CENTER | Age: 34
End: 2024-07-31

## 2024-07-31 NOTE — TELEPHONE ENCOUNTER
Patient has been noncompliant in BP monitoring program.  Has not submitted blood pressure readings since 7/28/24.  Called to remind patient to submit readings.   Pt states she was seen in office for BP, and was told that she doesn't;t have to be in the BP program any longer.  Per [facility], pt ok to be d/c'd from program

## 2025-08-14 ENCOUNTER — OFFICE VISIT (OUTPATIENT)
Dept: PHYSICAL THERAPY | Facility: CLINIC | Age: 35
End: 2025-08-14
Payer: COMMERCIAL

## 2025-08-18 ENCOUNTER — OFFICE VISIT (OUTPATIENT)
Dept: PHYSICAL THERAPY | Facility: CLINIC | Age: 35
End: 2025-08-18
Payer: COMMERCIAL

## 2025-08-18 DIAGNOSIS — M25.461 EFFUSION OF RIGHT KNEE: ICD-10-CM

## 2025-08-18 DIAGNOSIS — Z87.828 S/P ARTHROSCOPIC PARTIAL MEDIAL MENISCECTOMY OF RIGHT KNEE: ICD-10-CM

## 2025-08-18 DIAGNOSIS — M25.561 ACUTE PAIN OF RIGHT KNEE: Primary | ICD-10-CM

## 2025-08-18 DIAGNOSIS — Z98.890 S/P ARTHROSCOPIC PARTIAL MEDIAL MENISCECTOMY OF RIGHT KNEE: ICD-10-CM

## 2025-08-18 DIAGNOSIS — Z47.89 AFTERCARE FOLLOWING SURGERY OF THE MUSCULOSKELETAL SYSTEM: ICD-10-CM

## 2025-08-18 PROCEDURE — 97140 MANUAL THERAPY 1/> REGIONS: CPT | Performed by: PHYSICAL THERAPIST

## 2025-08-18 PROCEDURE — 97112 NEUROMUSCULAR REEDUCATION: CPT | Performed by: PHYSICAL THERAPIST

## 2025-08-18 PROCEDURE — 97110 THERAPEUTIC EXERCISES: CPT | Performed by: PHYSICAL THERAPIST

## 2025-08-21 ENCOUNTER — OFFICE VISIT (OUTPATIENT)
Dept: PHYSICAL THERAPY | Facility: CLINIC | Age: 35
End: 2025-08-21
Payer: COMMERCIAL

## 2025-08-21 DIAGNOSIS — M25.561 ACUTE PAIN OF RIGHT KNEE: Primary | ICD-10-CM

## 2025-08-21 DIAGNOSIS — Z47.89 AFTERCARE FOLLOWING SURGERY OF THE MUSCULOSKELETAL SYSTEM: ICD-10-CM

## 2025-08-21 DIAGNOSIS — M25.461 EFFUSION OF RIGHT KNEE: ICD-10-CM

## 2025-08-21 DIAGNOSIS — Z98.890 S/P ARTHROSCOPIC PARTIAL MEDIAL MENISCECTOMY OF RIGHT KNEE: ICD-10-CM

## 2025-08-21 DIAGNOSIS — Z87.828 S/P ARTHROSCOPIC PARTIAL MEDIAL MENISCECTOMY OF RIGHT KNEE: ICD-10-CM

## 2025-08-21 PROCEDURE — 97112 NEUROMUSCULAR REEDUCATION: CPT

## 2025-08-21 PROCEDURE — 97140 MANUAL THERAPY 1/> REGIONS: CPT

## 2025-08-21 PROCEDURE — 97110 THERAPEUTIC EXERCISES: CPT

## (undated) DEVICE — CHLORAPREP HI-LITE 26ML ORANGE

## (undated) DEVICE — ADHESIVE SKN CLSR HISTOACRYL FLEX 0.5ML LF

## (undated) DEVICE — ADHESIVE SKIN HIGH VISCOSITY EXOFIN 1ML

## (undated) DEVICE — GLOVE SRG BIOGEL 7.5

## (undated) DEVICE — SUT VICRYL 0 CT-1 36 IN J946H

## (undated) DEVICE — SUT CHROMIC 2-0 CT-1 27 IN 811H

## (undated) DEVICE — GLOVE SRG BIOGEL ECLIPSE 7

## (undated) DEVICE — SUT PLAIN 2-0 CTX 27 IN 872H

## (undated) DEVICE — SUT MONOCRYL 4-0 PS-2 27 IN Y426H